# Patient Record
Sex: FEMALE | Race: WHITE | NOT HISPANIC OR LATINO
[De-identification: names, ages, dates, MRNs, and addresses within clinical notes are randomized per-mention and may not be internally consistent; named-entity substitution may affect disease eponyms.]

---

## 2017-11-21 ENCOUNTER — RESULT REVIEW (OUTPATIENT)
Age: 67
End: 2017-11-21

## 2018-01-30 ENCOUNTER — APPOINTMENT (OUTPATIENT)
Dept: INTERNAL MEDICINE | Facility: CLINIC | Age: 68
End: 2018-01-30

## 2018-04-17 ENCOUNTER — FORM ENCOUNTER (OUTPATIENT)
Age: 68
End: 2018-04-17

## 2018-04-17 ENCOUNTER — LABORATORY RESULT (OUTPATIENT)
Age: 68
End: 2018-04-17

## 2018-04-17 ENCOUNTER — NON-APPOINTMENT (OUTPATIENT)
Age: 68
End: 2018-04-17

## 2018-04-17 ENCOUNTER — APPOINTMENT (OUTPATIENT)
Dept: INTERNAL MEDICINE | Facility: CLINIC | Age: 68
End: 2018-04-17
Payer: COMMERCIAL

## 2018-04-17 ENCOUNTER — TRANSCRIPTION ENCOUNTER (OUTPATIENT)
Age: 68
End: 2018-04-17

## 2018-04-17 VITALS — DIASTOLIC BLOOD PRESSURE: 70 MMHG | SYSTOLIC BLOOD PRESSURE: 130 MMHG

## 2018-04-17 DIAGNOSIS — M54.32 SCIATICA, LEFT SIDE: ICD-10-CM

## 2018-04-17 DIAGNOSIS — Z81.8 FAMILY HISTORY OF OTHER MENTAL AND BEHAVIORAL DISORDERS: ICD-10-CM

## 2018-04-17 DIAGNOSIS — Z78.9 OTHER SPECIFIED HEALTH STATUS: ICD-10-CM

## 2018-04-17 LAB
BILIRUB UR QL STRIP: NORMAL
CLARITY UR: CLEAR
COLLECTION METHOD: NORMAL
GLUCOSE UR-MCNC: NORMAL
HCG UR QL: 0.2 EU/DL
HGB UR QL STRIP.AUTO: NORMAL
KETONES UR-MCNC: NORMAL
LEUKOCYTE ESTERASE UR QL STRIP: NORMAL
NITRITE UR QL STRIP: NORMAL
PH UR STRIP: 5
PROT UR STRIP-MCNC: NORMAL
SP GR UR STRIP: 1.01

## 2018-04-17 PROCEDURE — 99204 OFFICE O/P NEW MOD 45 MIN: CPT | Mod: 25

## 2018-04-17 PROCEDURE — 93000 ELECTROCARDIOGRAM COMPLETE: CPT

## 2018-04-17 PROCEDURE — 81003 URINALYSIS AUTO W/O SCOPE: CPT | Mod: QW

## 2018-04-17 PROCEDURE — 36415 COLL VENOUS BLD VENIPUNCTURE: CPT

## 2018-04-17 PROCEDURE — 90670 PCV13 VACCINE IM: CPT

## 2018-04-17 PROCEDURE — G0009: CPT

## 2018-04-18 ENCOUNTER — APPOINTMENT (OUTPATIENT)
Dept: INTERNAL MEDICINE | Facility: CLINIC | Age: 68
End: 2018-04-18
Payer: COMMERCIAL

## 2018-04-18 ENCOUNTER — OUTPATIENT (OUTPATIENT)
Dept: OUTPATIENT SERVICES | Facility: HOSPITAL | Age: 68
LOS: 1 days | End: 2018-04-18
Payer: COMMERCIAL

## 2018-04-18 ENCOUNTER — CLINICAL ADVICE (OUTPATIENT)
Age: 68
End: 2018-04-18

## 2018-04-18 ENCOUNTER — APPOINTMENT (OUTPATIENT)
Dept: CT IMAGING | Facility: CLINIC | Age: 68
End: 2018-04-18
Payer: COMMERCIAL

## 2018-04-18 ENCOUNTER — LABORATORY RESULT (OUTPATIENT)
Age: 68
End: 2018-04-18

## 2018-04-18 DIAGNOSIS — Z00.8 ENCOUNTER FOR OTHER GENERAL EXAMINATION: ICD-10-CM

## 2018-04-18 LAB
25(OH)D3 SERPL-MCNC: 23.1 NG/ML
ALBUMIN SERPL ELPH-MCNC: 4.3 G/DL
ALP BLD-CCNC: 49 U/L
ALT SERPL-CCNC: 16 U/L
ANION GAP SERPL CALC-SCNC: 11 MMOL/L
AST SERPL-CCNC: 19 U/L
BASOPHILS # BLD AUTO: 0.06 K/UL
BASOPHILS NFR BLD AUTO: 0.9 %
BILIRUB SERPL-MCNC: 0.4 MG/DL
BUN SERPL-MCNC: 13 MG/DL
CALCIUM SERPL-MCNC: 10 MG/DL
CHLORIDE SERPL-SCNC: 104 MMOL/L
CHOLEST SERPL-MCNC: 248 MG/DL
CHOLEST/HDLC SERPL: 4.5 RATIO
CO2 SERPL-SCNC: 26 MMOL/L
CREAT SERPL-MCNC: 0.89 MG/DL
EOSINOPHIL # BLD AUTO: 0.11 K/UL
EOSINOPHIL NFR BLD AUTO: 1.7 %
FERRITIN SERPL-MCNC: 71 NG/ML
GLUCOSE SERPL-MCNC: 90 MG/DL
HBA1C MFR BLD HPLC: 5.4 %
HCT VFR BLD CALC: 48.4 %
HDLC SERPL-MCNC: 55 MG/DL
HGB BLD-MCNC: 15.7 G/DL
IMM GRANULOCYTES NFR BLD AUTO: 0.2 %
LDLC SERPL CALC-MCNC: 154 MG/DL
LYMPHOCYTES # BLD AUTO: 1.62 K/UL
LYMPHOCYTES NFR BLD AUTO: 24.7 %
MAN DIFF?: NORMAL
MCHC RBC-ENTMCNC: 29.1 PG
MCHC RBC-ENTMCNC: 32.4 GM/DL
MCV RBC AUTO: 89.8 FL
MONOCYTES # BLD AUTO: 0.41 K/UL
MONOCYTES NFR BLD AUTO: 6.3 %
NEUTROPHILS # BLD AUTO: 4.35 K/UL
NEUTROPHILS NFR BLD AUTO: 66.2 %
PLATELET # BLD AUTO: 215 K/UL
POTASSIUM SERPL-SCNC: 5.5 MMOL/L
PROT SERPL-MCNC: 7.3 G/DL
RBC # BLD: 5.39 M/UL
RBC # FLD: 15.2 %
SAVE SPECIMEN: NORMAL
SODIUM SERPL-SCNC: 141 MMOL/L
T3RU NFR SERPL: 1.13 INDEX
T4 SERPL-MCNC: 6.7 UG/DL
TRIGL SERPL-MCNC: 197 MG/DL
TSH SERPL-ACNC: 2.82 UIU/ML
URATE SERPL-MCNC: 4.5 MG/DL
VIT B12 SERPL-MCNC: 461 PG/ML
WBC # FLD AUTO: 6.56 K/UL

## 2018-04-18 PROCEDURE — 74176 CT ABD & PELVIS W/O CONTRAST: CPT

## 2018-04-18 PROCEDURE — 36415 COLL VENOUS BLD VENIPUNCTURE: CPT

## 2018-04-18 PROCEDURE — 74176 CT ABD & PELVIS W/O CONTRAST: CPT | Mod: 26

## 2018-04-19 LAB
POTASSIUM SERPL-SCNC: 4.4 MMOL/L
POTASSIUM SERPL-SCNC: 4.5 MMOL/L

## 2018-04-29 ENCOUNTER — FORM ENCOUNTER (OUTPATIENT)
Age: 68
End: 2018-04-29

## 2018-04-30 ENCOUNTER — APPOINTMENT (OUTPATIENT)
Dept: MRI IMAGING | Facility: CLINIC | Age: 68
End: 2018-04-30

## 2018-04-30 ENCOUNTER — APPOINTMENT (OUTPATIENT)
Dept: ULTRASOUND IMAGING | Facility: CLINIC | Age: 68
End: 2018-04-30
Payer: COMMERCIAL

## 2018-04-30 ENCOUNTER — OUTPATIENT (OUTPATIENT)
Dept: OUTPATIENT SERVICES | Facility: HOSPITAL | Age: 68
LOS: 1 days | End: 2018-04-30
Payer: COMMERCIAL

## 2018-04-30 DIAGNOSIS — R16.0 HEPATOMEGALY, NOT ELSEWHERE CLASSIFIED: ICD-10-CM

## 2018-04-30 PROCEDURE — 72148 MRI LUMBAR SPINE W/O DYE: CPT | Mod: 26

## 2018-04-30 PROCEDURE — 93975 VASCULAR STUDY: CPT | Mod: 26

## 2018-04-30 PROCEDURE — 72148 MRI LUMBAR SPINE W/O DYE: CPT

## 2018-04-30 PROCEDURE — 93975 VASCULAR STUDY: CPT

## 2018-05-15 ENCOUNTER — APPOINTMENT (OUTPATIENT)
Dept: CARDIOLOGY | Facility: CLINIC | Age: 68
End: 2018-05-15
Payer: COMMERCIAL

## 2018-05-15 VITALS
BODY MASS INDEX: 29.37 KG/M2 | TEMPERATURE: 98 F | HEIGHT: 64 IN | OXYGEN SATURATION: 97 % | SYSTOLIC BLOOD PRESSURE: 131 MMHG | DIASTOLIC BLOOD PRESSURE: 77 MMHG | HEART RATE: 78 BPM | WEIGHT: 172 LBS

## 2018-05-15 PROCEDURE — 99244 OFF/OP CNSLTJ NEW/EST MOD 40: CPT

## 2019-04-12 ENCOUNTER — LABORATORY RESULT (OUTPATIENT)
Age: 69
End: 2019-04-12

## 2019-04-12 ENCOUNTER — TRANSCRIPTION ENCOUNTER (OUTPATIENT)
Age: 69
End: 2019-04-12

## 2019-04-12 ENCOUNTER — APPOINTMENT (OUTPATIENT)
Dept: INTERNAL MEDICINE | Facility: CLINIC | Age: 69
End: 2019-04-12
Payer: COMMERCIAL

## 2019-04-12 VITALS
TEMPERATURE: 98.3 F | WEIGHT: 171 LBS | HEART RATE: 77 BPM | BODY MASS INDEX: 29.92 KG/M2 | SYSTOLIC BLOOD PRESSURE: 134 MMHG | DIASTOLIC BLOOD PRESSURE: 83 MMHG | HEIGHT: 63.5 IN

## 2019-04-12 PROCEDURE — 99213 OFFICE O/P EST LOW 20 MIN: CPT

## 2019-04-12 NOTE — ASSESSMENT
[FreeTextEntry1] : As per Dr Montelongo, start Amlodipine 2.5mg \par keep hands warm\par Vascular \par Labs\par \par Possible Raynauds\par \par \par FU with ENT, nasal spray

## 2019-04-12 NOTE — PHYSICAL EXAM
[No Acute Distress] : no acute distress [Normal Outer Ear/Nose] : the outer ears and nose were normal in appearance [Well Nourished] : well nourished [Well Developed] : well developed [No Respiratory Distress] : no respiratory distress  [Normal Oropharynx] : the oropharynx was normal [Clear to Auscultation] : lungs were clear to auscultation bilaterally [No Accessory Muscle Use] : no accessory muscle use [Normal S1, S2] : normal S1 and S2 [Normal Rate] : normal rate  [Regular Rhythm] : with a regular rhythm [Normal Affect] : the affect was normal [de-identified] : cloudy krish white ear drums [Normal Insight/Judgement] : insight and judgment were intact

## 2019-04-12 NOTE — HISTORY OF PRESENT ILLNESS
[FreeTextEntry8] : 68 year old female here today with c/o 10 days of blueish finger. \par It sometimes turns white. \par It is numb. \par She states its better in the morning when her hands are covered overnight. \par She states it feels cold. \par \par Her ear also feels clogged. She states she usually has issues with her ears.

## 2019-04-17 LAB
ALBUMIN SERPL ELPH-MCNC: 4.5 G/DL
ALP BLD-CCNC: 49 U/L
ALT SERPL-CCNC: 17 U/L
ANA PAT FLD IF-IMP: ABNORMAL
ANA PATTERN: ABNORMAL
ANA SER IF-ACNC: ABNORMAL
ANA TITER: ABNORMAL
ANION GAP SERPL CALC-SCNC: 10 MMOL/L
AST SERPL-CCNC: 21 U/L
BASOPHILS # BLD AUTO: 0.06 K/UL
BASOPHILS NFR BLD AUTO: 0.9 %
BILIRUB SERPL-MCNC: 0.2 MG/DL
BUN SERPL-MCNC: 15 MG/DL
CALCIUM SERPL-MCNC: 10 MG/DL
CHLORIDE SERPL-SCNC: 103 MMOL/L
CHOLEST SERPL-MCNC: 224 MG/DL
CHOLEST/HDLC SERPL: 3.4 RATIO
CO2 SERPL-SCNC: 27 MMOL/L
CREAT SERPL-MCNC: 0.8 MG/DL
EOSINOPHIL # BLD AUTO: 0.1 K/UL
EOSINOPHIL NFR BLD AUTO: 1.4 %
ERYTHROCYTE [SEDIMENTATION RATE] IN BLOOD BY WESTERGREN METHOD: 5 MM/HR
GLUCOSE SERPL-MCNC: 85 MG/DL
HCT VFR BLD CALC: 50.6 %
HDLC SERPL-MCNC: 66 MG/DL
HGB BLD-MCNC: 15.8 G/DL
IMM GRANULOCYTES NFR BLD AUTO: 0.4 %
LDLC SERPL CALC-MCNC: 135 MG/DL
LYMPHOCYTES # BLD AUTO: 1.97 K/UL
LYMPHOCYTES NFR BLD AUTO: 28.6 %
MAN DIFF?: NORMAL
MCHC RBC-ENTMCNC: 29.7 PG
MCHC RBC-ENTMCNC: 31.2 GM/DL
MCV RBC AUTO: 95.1 FL
MONOCYTES # BLD AUTO: 0.51 K/UL
MONOCYTES NFR BLD AUTO: 7.4 %
NEUTROPHILS # BLD AUTO: 4.23 K/UL
NEUTROPHILS NFR BLD AUTO: 61.3 %
PLATELET # BLD AUTO: 236 K/UL
POTASSIUM SERPL-SCNC: 5.2 MMOL/L
PROT SERPL-MCNC: 7.4 G/DL
RBC # BLD: 5.32 M/UL
RBC # FLD: 14.6 %
SAVE SPECIMEN: NORMAL
SODIUM SERPL-SCNC: 140 MMOL/L
TRIGL SERPL-MCNC: 113 MG/DL
WBC # FLD AUTO: 6.9 K/UL

## 2019-04-30 ENCOUNTER — RESULT REVIEW (OUTPATIENT)
Age: 69
End: 2019-04-30

## 2019-05-06 ENCOUNTER — APPOINTMENT (OUTPATIENT)
Dept: OTOLARYNGOLOGY | Facility: CLINIC | Age: 69
End: 2019-05-06
Payer: COMMERCIAL

## 2019-05-06 VITALS
DIASTOLIC BLOOD PRESSURE: 77 MMHG | HEART RATE: 87 BPM | SYSTOLIC BLOOD PRESSURE: 123 MMHG | HEIGHT: 64 IN | WEIGHT: 170 LBS | BODY MASS INDEX: 29.02 KG/M2

## 2019-05-06 PROCEDURE — 99204 OFFICE O/P NEW MOD 45 MIN: CPT | Mod: 25

## 2019-05-06 PROCEDURE — 92557 COMPREHENSIVE HEARING TEST: CPT

## 2019-05-06 PROCEDURE — 92567 TYMPANOMETRY: CPT

## 2019-05-06 NOTE — PHYSICAL EXAM
[Midline] : trachea located in midline position [Normal] : no rashes [de-identified] : b/l thin TM and retracted, clear fluid in Left [] : septum deviated to the left

## 2019-05-06 NOTE — REASON FOR VISIT
[Initial Evaluation] : an initial evaluation for [Hearing Loss] : hearing loss [FreeTextEntry2] : L sided hearing loss

## 2019-05-06 NOTE — HISTORY OF PRESENT ILLNESS
[M & T] : myringotomy tube [Hearing Loss] : hearing loss [Recurrent Otitis Media] : recurrent otitis media [Otitis Media with Effusion] : otitis media with effusion [Eustachian Tube Dysfunction] : eustachian tube dysfunction [Nasal Congestion] : nasal congestion [Ear Fullness] : ear fullness [None] : No risk factors have been identified. [de-identified] : 67 y/o female w/ hx of recurrent ear infections, SNHL loss,  BMT 2014 and L TM perforation (1974) who came in complaining of hearing loss in her L ear that started 2 months ago. She states prior to losing her hearing she had a cold and traveled to [place]. She used pseuophed and steroids but it did not help her hearing. Her L ear feels clogged today. She was seen by Dr. Temple in he past who did her tubes. She has mild nasal congestion today. She has no other modifying factors. Pt has no ear pain, ear drainage, tinnitus, vertigo, nasal congestion, nasal discharge, epistaxis, sinus infections, facial pain, facial pressure, throat pain, dysphagia or fevers\par  [Tinnitus] : no tinnitus [Anxiety] : no anxiety [Dizziness] : no dizziness [Headache] : no headache [Otalgia] : no otalgia [Otorrhea] : no otorrhea [Vertigo] : no vertigo [Congenital Ear Malformation] : no congenital ear malformation [Meniere Disease] : no Meniere disease [Otosclerosis] : no otosclerosis [Perilymphatic Fistula] : no perilymphatic fistula [Hypertension] : no hypertension [Loud Noise Exposure] : no history of loud noise exposure [Smoking] : no smoking [Early Onset Hearing Loss] : no early onset hearing loss [Stroke] : no stroke [Facial Pain] : no facial pain [Facial Pressure] : no facial pressure [Clear Rhinorrhea] : no clear rhinorrhea [Purulent Rhinorrhea] : no purulent rhinorrhea [Postnasal Drainage] : no postnasal drainage [Ear Pressure] : no ear pressure [Ear Pain] : no ear pain [Diplopia] : no diplopia [Environmental Allergies] : no environmental allergies [Allergic Rhinitis] : no allergic rhinitis [Adenoidectomy] : no adenoidectomy [Environmental Allergens] : no environmental allergens [Seasonal Allergies] : no seasonal allergies [Allergies] : no allergies [Asthma] : no asthma [Chills] : no chills [Neck Mass] : no neck mass [Neck Pain] : no neck pain [Cold Intolerance] : no cold intolerance [Cough] : no cough [Heat Intolerance] : no heat intolerance [Fatigue] : no fatigue [Hyperthyroidism] : no hyperthyroidism [Sialadenitis] : no sialadenitis [Non-Hodgkin Lymphoma] : no non-hodgkin lymphoma [Hodgkin Disease] : no hodgkin disease [Alcohol Use] : no alcohol use [Tobacco Use] : no tobacco use [Graves Disease] : no graves disease [Thyroid Cancer] : no thyroid cancer

## 2019-05-06 NOTE — ASSESSMENT
[FreeTextEntry1] : 67 y/o female w/ hx of recurrent ear infections, SNHL, and BMT 20174 who is having L sided hearing loss and fluid in the L ear \par \par Dx ETD and hearing loss\par Rx Flonase and pop ears\par poss M&T\par \par f/u 7-10 days\par \par \par

## 2019-05-13 ENCOUNTER — NON-APPOINTMENT (OUTPATIENT)
Age: 69
End: 2019-05-13

## 2019-05-13 ENCOUNTER — APPOINTMENT (OUTPATIENT)
Dept: INTERNAL MEDICINE | Facility: CLINIC | Age: 69
End: 2019-05-13
Payer: COMMERCIAL

## 2019-05-13 VITALS — DIASTOLIC BLOOD PRESSURE: 70 MMHG | SYSTOLIC BLOOD PRESSURE: 120 MMHG

## 2019-05-13 VITALS — BODY MASS INDEX: 29.77 KG/M2 | HEIGHT: 63 IN | WEIGHT: 168 LBS

## 2019-05-13 DIAGNOSIS — R00.0 TACHYCARDIA, UNSPECIFIED: ICD-10-CM

## 2019-05-13 PROCEDURE — 36415 COLL VENOUS BLD VENIPUNCTURE: CPT

## 2019-05-13 PROCEDURE — 93000 ELECTROCARDIOGRAM COMPLETE: CPT

## 2019-05-13 PROCEDURE — 99397 PER PM REEVAL EST PAT 65+ YR: CPT | Mod: 25

## 2019-05-13 NOTE — HEALTH RISK ASSESSMENT
[No falls in past year] : Patient reported no falls in the past year [0] : 2) Feeling down, depressed, or hopeless: Not at all (0) [Fully functional (bathing, dressing, toileting, transferring, walking, feeding)] : Fully functional (bathing, dressing, toileting, transferring, walking, feeding) [Fully functional (using the telephone, shopping, preparing meals, housekeeping, doing laundry, using] : Fully functional and needs no help or supervision to perform IADLs (using the telephone, shopping, preparing meals, housekeeping, doing laundry, using transportation, managing medications and managing finances) [With Patient/Caregiver] : With Patient/Caregiver [I will adhere to the patient's wishes as expressed in the advance directive except as noted below.] : I will adhere to the patient's wishes as expressed in the advance directive except as noted below [] : No [[id number]] : 0 [AdvancecareDate] : 5/13/19

## 2019-05-13 NOTE — REVIEW OF SYSTEMS
[Back Pain] : back pain [Negative] : Heme/Lymph [FreeTextEntry5] : Raynaud's syndrome of both hands [Palpitations] : no palpitations

## 2019-05-13 NOTE — HISTORY OF PRESENT ILLNESS
[de-identified] : This is a 68-year-old woman here for her annual examination. Her main concern is severe Raynaud's syndrome. She went to see a rheumatologist who did extensive immune evaluation this and the only positive result for mildly elevated Sjogren syndrome titers. He treated her with Norvasc 5 mg daily and will switch from Trental  400 mg t.i.d. and also started her on Lipitor 10 mg daily because of elevated cholesterol.

## 2019-05-13 NOTE — ASSESSMENT
[FreeTextEntry1] : Problems\par Raynaud's syndrome- this is presently being treated with Norvasc 5 mg daily, Lipitor 10 mg daily, Trental 400 mg t.i.d.\par Hypercholesterolemia- she was recently started on Lipitor 10 mg daily\par I also referred the patient for arterial Dopplers of the upper sternum and is

## 2019-05-13 NOTE — PHYSICAL EXAM
[Well Nourished] : well nourished [Well Developed] : well developed [Well-Appearing] : well-appearing [EOMI] : extraocular movements intact [PERRL] : pupils equal round and reactive to light [Normal Oropharynx] : the oropharynx was normal [No Lymphadenopathy] : no lymphadenopathy [Supple] : supple [Clear to Auscultation] : lungs were clear to auscultation bilaterally [Thyroid Normal, No Nodules] : the thyroid was normal and there were no nodules present [No Accessory Muscle Use] : no accessory muscle use [No Murmur] : no murmur heard [Regular Rhythm] : with a regular rhythm [Normal S1, S2] : normal S1 and S2 [No Carotid Bruits] : no carotid bruits [No Varicosities] : no varicosities [Pedal Pulses Present] : the pedal pulses are present [Declined Rectal Exam] : declined rectal exam [Normal Posterior Cervical Nodes] : no posterior cervical lymphadenopathy [Declined Breast Exam] : declined breast exam  [Normal Anterior Cervical Nodes] : no anterior cervical lymphadenopathy [No CVA Tenderness] : no CVA  tenderness [No Joint Swelling] : no joint swelling [No Spinal Tenderness] : no spinal tenderness [Grossly Normal Strength/Tone] : grossly normal strength/tone [No Rash] : no rash [Normal Gait] : normal gait [Coordination Grossly Intact] : coordination grossly intact [Deep Tendon Reflexes (DTR)] : deep tendon reflexes were 2+ and symmetric [No Focal Deficits] : no focal deficits [Normal Insight/Judgement] : insight and judgment were intact [Normal Affect] : the affect was normal [de-identified] : Hepatomegaly

## 2019-05-14 ENCOUNTER — FORM ENCOUNTER (OUTPATIENT)
Age: 69
End: 2019-05-14

## 2019-05-15 ENCOUNTER — APPOINTMENT (OUTPATIENT)
Dept: ULTRASOUND IMAGING | Facility: CLINIC | Age: 69
End: 2019-05-15
Payer: COMMERCIAL

## 2019-05-15 ENCOUNTER — OUTPATIENT (OUTPATIENT)
Dept: OUTPATIENT SERVICES | Facility: HOSPITAL | Age: 69
LOS: 1 days | End: 2019-05-15

## 2019-05-15 DIAGNOSIS — I73.00 RAYNAUD'S SYNDROME WITHOUT GANGRENE: ICD-10-CM

## 2019-05-15 PROCEDURE — 93930 UPPER EXTREMITY STUDY: CPT | Mod: 26

## 2019-09-18 ENCOUNTER — APPOINTMENT (OUTPATIENT)
Dept: INTERNAL MEDICINE | Facility: CLINIC | Age: 69
End: 2019-09-18
Payer: COMMERCIAL

## 2019-09-18 PROCEDURE — G0009: CPT

## 2019-09-18 PROCEDURE — 90732 PPSV23 VACC 2 YRS+ SUBQ/IM: CPT

## 2019-10-23 ENCOUNTER — APPOINTMENT (OUTPATIENT)
Dept: OTOLARYNGOLOGY | Facility: CLINIC | Age: 69
End: 2019-10-23
Payer: COMMERCIAL

## 2019-10-23 VITALS
HEART RATE: 77 BPM | SYSTOLIC BLOOD PRESSURE: 119 MMHG | BODY MASS INDEX: 30.12 KG/M2 | WEIGHT: 170 LBS | DIASTOLIC BLOOD PRESSURE: 72 MMHG | HEIGHT: 63 IN

## 2019-10-23 PROCEDURE — 69433 CREATE EARDRUM OPENING: CPT | Mod: LT

## 2019-10-23 PROCEDURE — 99214 OFFICE O/P EST MOD 30 MIN: CPT | Mod: 25

## 2019-10-23 PROCEDURE — 92504 EAR MICROSCOPY EXAMINATION: CPT | Mod: 59

## 2019-10-23 NOTE — PROCEDURE
[Risk and Benefits Discussed] : The purpose, risks, discomforts, benefits and alternatives of the procedure have been explained to the patient including no treatment. [Cerumen Impaction] : Cerumen Impaction [Same] : same as the Pre Op Dx. [] : M & T [FreeTextEntry1] : left OME [FreeTextEntry6] : wax removed \par the microscope was necessary for illumination and magnification\par M&T performed and pt reported improved hearing

## 2019-10-23 NOTE — REASON FOR VISIT
[Subsequent Evaluation] : a subsequent evaluation for [Hearing Loss] : hearing loss [FreeTextEntry2] : L sided hearing loss

## 2019-10-23 NOTE — PHYSICAL EXAM
[de-identified] : b/l thin TM and retracted, clear fluid in Left [] : septum deviated to the left [Midline] : trachea located in midline position [Normal] : no rashes

## 2019-10-23 NOTE — ASSESSMENT
[FreeTextEntry1] : 67 y/o female w/ hx of recurrent ear infections, SNHL, and BMT 2014 who is having L sided hearing loss and fluid in the L ear \par \par Dx ETD and hearing loss\par tx-M&T\par \par f/u 2 weeks\par \par \par

## 2019-10-23 NOTE — HISTORY OF PRESENT ILLNESS
[M & T] : myringotomy tube [de-identified] : 67 y/o female w/ hx of recurrent ear infections, SNHL loss,  BMT 2014 and L TM perforation (1974) who came in complaining of hearing loss in her L ear that started 2 months ago. She states prior to losing her hearing she had a cold and traveled to [place]. She used pseuophed and steroids but it did not help her hearing. Her L ear feels clogged today. She was seen by Dr. Temple in he past who did her tubes. She has mild nasal congestion today. She has no other modifying factors. Pt has no ear pain, ear drainage, tinnitus, vertigo, nasal congestion, nasal discharge, epistaxis, sinus infections, facial pain, facial pressure, throat pain, dysphagia or fevers\par  [Tinnitus] : no tinnitus [Anxiety] : no anxiety [Dizziness] : no dizziness [Headache] : no headache [Hearing Loss] : hearing loss [Otalgia] : no otalgia [Otorrhea] : no otorrhea [Vertigo] : no vertigo [Recurrent Otitis Media] : recurrent otitis media [Otitis Media with Effusion] : otitis media with effusion [Congenital Ear Malformation] : no congenital ear malformation [Meniere Disease] : no Meniere disease [Eustachian Tube Dysfunction] : eustachian tube dysfunction [Otosclerosis] : no otosclerosis [Perilymphatic Fistula] : no perilymphatic fistula [Hypertension] : no hypertension [Loud Noise Exposure] : no history of loud noise exposure [Smoking] : no smoking [Early Onset Hearing Loss] : no early onset hearing loss [Stroke] : no stroke [Facial Pain] : no facial pain [Clear Rhinorrhea] : no clear rhinorrhea [Facial Pressure] : no facial pressure [Purulent Rhinorrhea] : no purulent rhinorrhea [Nasal Congestion] : nasal congestion [Postnasal Drainage] : no postnasal drainage [Ear Pain] : no ear pain [Ear Pressure] : no ear pressure [Diplopia] : no diplopia [Ear Fullness] : ear fullness [Allergic Rhinitis] : no allergic rhinitis [Environmental Allergies] : no environmental allergies [Seasonal Allergies] : no seasonal allergies [Environmental Allergens] : no environmental allergens [Adenoidectomy] : no adenoidectomy [Allergies] : no allergies [Asthma] : no asthma [Neck Mass] : no neck mass [Neck Pain] : no neck pain [Chills] : no chills [Cold Intolerance] : no cold intolerance [Cough] : no cough [Fatigue] : no fatigue [Heat Intolerance] : no heat intolerance [Hyperthyroidism] : no hyperthyroidism [Sialadenitis] : no sialadenitis [Hodgkin Disease] : no hodgkin disease [Non-Hodgkin Lymphoma] : no non-hodgkin lymphoma [None] : No risk factors have been identified. [Tobacco Use] : no tobacco use [Alcohol Use] : no alcohol use [Graves Disease] : no graves disease [Thyroid Cancer] : no thyroid cancer

## 2019-11-07 ENCOUNTER — APPOINTMENT (OUTPATIENT)
Dept: OTOLARYNGOLOGY | Facility: CLINIC | Age: 69
End: 2019-11-07
Payer: COMMERCIAL

## 2019-11-07 VITALS
DIASTOLIC BLOOD PRESSURE: 77 MMHG | BODY MASS INDEX: 30.12 KG/M2 | HEIGHT: 63 IN | WEIGHT: 170 LBS | SYSTOLIC BLOOD PRESSURE: 120 MMHG | HEART RATE: 72 BPM

## 2019-11-07 PROCEDURE — 92557 COMPREHENSIVE HEARING TEST: CPT

## 2019-11-07 PROCEDURE — 69433 CREATE EARDRUM OPENING: CPT | Mod: RT

## 2019-11-07 PROCEDURE — 99214 OFFICE O/P EST MOD 30 MIN: CPT | Mod: 25

## 2019-11-07 PROCEDURE — 92567 TYMPANOMETRY: CPT

## 2019-11-07 PROCEDURE — 92504 EAR MICROSCOPY EXAMINATION: CPT | Mod: 59

## 2019-11-08 NOTE — HISTORY OF PRESENT ILLNESS
[M & T] : myringotomy tube [de-identified] : hearing better in left ear s/p M&T now wants Right M&T\par 67 y/o female w/ hx of recurrent ear infections, SNHL loss,  BMT 2014 and L TM perforation (1974) who came in complaining of hearing loss in her L ear that started 2 months ago. She states prior to losing her hearing she had a cold and traveled to [place]. She used pseuophed and steroids but it did not help her hearing. Her L ear feels clogged today. She was seen by Dr. Temple in he past who did her tubes. She has mild nasal congestion today. She has no other modifying factors. Pt has no ear pain, ear drainage, tinnitus, vertigo, nasal congestion, nasal discharge, epistaxis, sinus infections, facial pain, facial pressure, throat pain, dysphagia or fevers\par  [Tinnitus] : no tinnitus [Anxiety] : no anxiety [Dizziness] : no dizziness [Headache] : no headache [Hearing Loss] : hearing loss [Otalgia] : no otalgia [Otorrhea] : no otorrhea [Vertigo] : no vertigo [Recurrent Otitis Media] : recurrent otitis media [Otitis Media with Effusion] : otitis media with effusion [Congenital Ear Malformation] : no congenital ear malformation [Meniere Disease] : no Meniere disease [Eustachian Tube Dysfunction] : eustachian tube dysfunction [Otosclerosis] : no otosclerosis [Perilymphatic Fistula] : no perilymphatic fistula [Hypertension] : no hypertension [Loud Noise Exposure] : no history of loud noise exposure [Smoking] : no smoking [Early Onset Hearing Loss] : no early onset hearing loss [Stroke] : no stroke [Facial Pain] : no facial pain [Clear Rhinorrhea] : no clear rhinorrhea [Purulent Rhinorrhea] : no purulent rhinorrhea [Facial Pressure] : no facial pressure [Nasal Congestion] : nasal congestion [Postnasal Drainage] : no postnasal drainage [Ear Pain] : no ear pain [Ear Pressure] : no ear pressure [Diplopia] : no diplopia [Ear Fullness] : ear fullness [Allergic Rhinitis] : no allergic rhinitis [Environmental Allergies] : no environmental allergies [Seasonal Allergies] : no seasonal allergies [Adenoidectomy] : no adenoidectomy [Environmental Allergens] : no environmental allergens [Allergies] : no allergies [Asthma] : no asthma [Neck Mass] : no neck mass [Neck Pain] : no neck pain [Chills] : no chills [Cold Intolerance] : no cold intolerance [Cough] : no cough [Fatigue] : no fatigue [Heat Intolerance] : no heat intolerance [Hyperthyroidism] : no hyperthyroidism [Sialadenitis] : no sialadenitis [None] : No risk factors have been identified. [Hodgkin Disease] : no hodgkin disease [Non-Hodgkin Lymphoma] : no non-hodgkin lymphoma [Tobacco Use] : no tobacco use [Alcohol Use] : no alcohol use [Thyroid Cancer] : no thyroid cancer [Graves Disease] : no graves disease

## 2019-11-08 NOTE — DATA REVIEWED
[de-identified] : left ear improved\par right mild chl\par Hearing Test performed to evaluate the extent of hearing loss and assymetry to explain pt's symptoms\par \par

## 2019-11-08 NOTE — PHYSICAL EXAM
[de-identified] : left tube ok  right ETD [] : septum deviated to the left [Midline] : trachea located in midline position [Normal] : no rashes

## 2019-11-08 NOTE — ASSESSMENT
[FreeTextEntry1] : 69 y/o female w/ hx of recurrent ear infections, SNHL, and BMT 2014 who is having R sided hearing loss and fluid in the R ear \par \par now s/p BMT\par f/u 2 weelks\par \par \par \par

## 2019-11-21 ENCOUNTER — APPOINTMENT (OUTPATIENT)
Dept: OTOLARYNGOLOGY | Facility: CLINIC | Age: 69
End: 2019-11-21
Payer: COMMERCIAL

## 2019-11-21 VITALS
HEIGHT: 63 IN | DIASTOLIC BLOOD PRESSURE: 66 MMHG | BODY MASS INDEX: 30.12 KG/M2 | WEIGHT: 170 LBS | HEART RATE: 86 BPM | SYSTOLIC BLOOD PRESSURE: 109 MMHG

## 2019-11-21 PROCEDURE — 99213 OFFICE O/P EST LOW 20 MIN: CPT

## 2019-11-21 NOTE — HISTORY OF PRESENT ILLNESS
[M & T] : myringotomy tube [de-identified] : s/p BMT and is hearing bteer\par occ ear crackling\par hx-67 y/o female w/ hx of recurrent ear infections, SNHL loss,  BMT 2014 and L TM perforation (1974) who came in complaining of hearing loss in her L ear that started 2 months ago. She states prior to losing her hearing she had a cold and traveled to [place]. She used pseuophed and steroids but it did not help her hearing. Her L ear feels clogged today. She was seen by Dr. Temple in he past who did her tubes. She has mild nasal congestion today. She has no other modifying factors. Pt has no ear pain, ear drainage, tinnitus, vertigo, nasal congestion, nasal discharge, epistaxis, sinus infections, facial pain, facial pressure, throat pain, dysphagia or fevers\par  [Tinnitus] : no tinnitus [Anxiety] : no anxiety [Dizziness] : no dizziness [Headache] : no headache [Hearing Loss] : hearing loss [Otalgia] : no otalgia [Otorrhea] : no otorrhea [Vertigo] : no vertigo [Recurrent Otitis Media] : recurrent otitis media [Otitis Media with Effusion] : otitis media with effusion [Congenital Ear Malformation] : no congenital ear malformation [Meniere Disease] : no Meniere disease [Eustachian Tube Dysfunction] : eustachian tube dysfunction [Otosclerosis] : no otosclerosis [Perilymphatic Fistula] : no perilymphatic fistula [Hypertension] : no hypertension [Loud Noise Exposure] : no history of loud noise exposure [Smoking] : no smoking [Early Onset Hearing Loss] : no early onset hearing loss [Stroke] : no stroke [Facial Pain] : no facial pain [Clear Rhinorrhea] : no clear rhinorrhea [Facial Pressure] : no facial pressure [Purulent Rhinorrhea] : no purulent rhinorrhea [Nasal Congestion] : nasal congestion [Postnasal Drainage] : no postnasal drainage [Ear Pain] : no ear pain [Ear Pressure] : no ear pressure [Diplopia] : no diplopia [Ear Fullness] : ear fullness [Allergic Rhinitis] : no allergic rhinitis [Environmental Allergies] : no environmental allergies [Seasonal Allergies] : no seasonal allergies [Environmental Allergens] : no environmental allergens [Adenoidectomy] : no adenoidectomy [Allergies] : no allergies [Asthma] : no asthma [Neck Mass] : no neck mass [Neck Pain] : no neck pain [Chills] : no chills [Cold Intolerance] : no cold intolerance [Cough] : no cough [Fatigue] : no fatigue [Heat Intolerance] : no heat intolerance [Hyperthyroidism] : no hyperthyroidism [Sialadenitis] : no sialadenitis [Hodgkin Disease] : no hodgkin disease [Non-Hodgkin Lymphoma] : no non-hodgkin lymphoma [None] : No risk factors have been identified. [Tobacco Use] : no tobacco use [Alcohol Use] : no alcohol use [Graves Disease] : no graves disease [Thyroid Cancer] : no thyroid cancer

## 2019-11-21 NOTE — PHYSICAL EXAM
[de-identified] : tubes ok bilat [] : septum deviated to the left [Midline] : trachea located in midline position [Normal] : salivary glands are normal

## 2019-11-21 NOTE — ASSESSMENT
[FreeTextEntry1] : This patient has a history of Eustachian tube dysfunction and conductive hearing loss and is seen following ear tube placement. I recommend expectant management and follow up in 3-4 months\par \par

## 2020-02-03 ENCOUNTER — APPOINTMENT (OUTPATIENT)
Dept: INTERNAL MEDICINE | Facility: CLINIC | Age: 70
End: 2020-02-03
Payer: COMMERCIAL

## 2020-02-03 VITALS — BODY MASS INDEX: 28.68 KG/M2 | OXYGEN SATURATION: 98 % | WEIGHT: 168 LBS | TEMPERATURE: 97.7 F | HEIGHT: 64 IN

## 2020-02-03 VITALS — DIASTOLIC BLOOD PRESSURE: 60 MMHG | SYSTOLIC BLOOD PRESSURE: 110 MMHG

## 2020-02-03 PROCEDURE — 99214 OFFICE O/P EST MOD 30 MIN: CPT

## 2020-02-03 RX ORDER — OFLOXACIN OTIC 3 MG/ML
0.3 SOLUTION AURICULAR (OTIC)
Qty: 1 | Refills: 3 | Status: DISCONTINUED | COMMUNITY
Start: 2019-10-23 | End: 2020-02-03

## 2020-02-07 RX ORDER — METHYLPREDNISOLONE 4 MG/1
4 TABLET ORAL
Qty: 1 | Refills: 1 | Status: DISCONTINUED | COMMUNITY
Start: 2020-02-03 | End: 2020-02-07

## 2020-02-07 NOTE — PHYSICAL EXAM
[Normal] : soft, non-tender, non-distended, no masses palpated, no HSM and normal bowel sounds [de-identified] : Bilateral rhonchi

## 2020-02-07 NOTE — HISTORY OF PRESENT ILLNESS
[de-identified] : This is a 69-year-old patient with a history of an autoimmune disorder characterized by an NATIVIDAD of 2500. In addition she has chronic ear infections with sensorineural hearing loss and presently has bilateral tubes in both ears. She's presently complaining of nasal discharge productive cough and multi-decrease in hearing. She denies any fever or chills

## 2020-02-07 NOTE — ASSESSMENT
[FreeTextEntry1] : Problems\par Immune disorder- the patient has a positive NATIVIDAD of 2500. Her further serological tests are essentially normal she is being treated with a calcium channel blocker for her Raynaud's disease\par Hearing loss- this is being followed by her ENT physician\par Chronic cough\par Nasal congestion\par Pharyngitis\par Assessment\par This is a patient with a normal disorder who clearly has an upper and lower respiratory tract infection characterized by probably a sinusitis with some ear infections and distally of bronchitis. I treated her with Augmentin 875 mg b.i.d. for a week and I started her on his Medrol pack. In addition I started her on Tessalon Perles 100 mg t.i.d. and referred her to our ear nose and throat doctor for further followup

## 2020-02-07 NOTE — REVIEW OF SYSTEMS
[Hearing Loss] : hearing loss [Nasal Discharge] : nasal discharge [Sore Throat] : sore throat [Postnasal Drip] : postnasal drip [Cough] : cough [Joint Pain] : joint pain [Muscle Pain] : muscle pain [Negative] : Gastrointestinal [Wheezing] : no wheezing

## 2020-02-18 ENCOUNTER — APPOINTMENT (OUTPATIENT)
Dept: INTERNAL MEDICINE | Facility: CLINIC | Age: 70
End: 2020-02-18
Payer: COMMERCIAL

## 2020-02-18 VITALS — TEMPERATURE: 98.2 F | WEIGHT: 168 LBS | BODY MASS INDEX: 28.68 KG/M2 | HEIGHT: 64 IN

## 2020-02-18 DIAGNOSIS — Z87.09 PERSONAL HISTORY OF OTHER DISEASES OF THE RESPIRATORY SYSTEM: ICD-10-CM

## 2020-02-18 LAB — S PYO AG SPEC QL IA: NEGATIVE

## 2020-02-18 PROCEDURE — 87880 STREP A ASSAY W/OPTIC: CPT | Mod: QW

## 2020-02-18 PROCEDURE — 99213 OFFICE O/P EST LOW 20 MIN: CPT | Mod: 25

## 2020-02-18 RX ORDER — AMOXICILLIN AND CLAVULANATE POTASSIUM 875; 125 MG/1; MG/1
875-125 TABLET, COATED ORAL
Qty: 14 | Refills: 1 | Status: DISCONTINUED | COMMUNITY
Start: 2020-02-03 | End: 2020-02-18

## 2020-02-18 NOTE — ASSESSMENT
[FreeTextEntry1] : Vital signs are stable her physical examination was normal except for a mild pharyngitis. She was treated with a Z-Owen

## 2020-02-18 NOTE — HISTORY OF PRESENT ILLNESS
[de-identified] : This is a patient who recently was treated for an asthmatic bronchitis who is back because of a recurrent pharyngitis and occasional cough. She denies any sputum or any fever

## 2020-11-17 ENCOUNTER — RESULT REVIEW (OUTPATIENT)
Age: 70
End: 2020-11-17

## 2020-12-23 PROBLEM — Z87.09 HISTORY OF ACUTE BRONCHITIS: Status: RESOLVED | Noted: 2020-02-03 | Resolved: 2020-12-23

## 2021-01-28 ENCOUNTER — NON-APPOINTMENT (OUTPATIENT)
Age: 71
End: 2021-01-28

## 2021-03-02 ENCOUNTER — LABORATORY RESULT (OUTPATIENT)
Age: 71
End: 2021-03-02

## 2021-03-02 ENCOUNTER — NON-APPOINTMENT (OUTPATIENT)
Age: 71
End: 2021-03-02

## 2021-03-02 ENCOUNTER — APPOINTMENT (OUTPATIENT)
Dept: INTERNAL MEDICINE | Facility: CLINIC | Age: 71
End: 2021-03-02
Payer: MEDICARE

## 2021-03-02 VITALS — SYSTOLIC BLOOD PRESSURE: 110 MMHG | DIASTOLIC BLOOD PRESSURE: 70 MMHG

## 2021-03-02 VITALS — WEIGHT: 165 LBS | TEMPERATURE: 97.8 F | HEIGHT: 64 IN | BODY MASS INDEX: 28.17 KG/M2

## 2021-03-02 DIAGNOSIS — H65.492 OTHER CHRONIC NONSUPPURATIVE OTITIS MEDIA, LEFT EAR: ICD-10-CM

## 2021-03-02 DIAGNOSIS — Z87.898 PERSONAL HISTORY OF OTHER SPECIFIED CONDITIONS: ICD-10-CM

## 2021-03-02 DIAGNOSIS — R79.89 OTHER SPECIFIED ABNORMAL FINDINGS OF BLOOD CHEMISTRY: ICD-10-CM

## 2021-03-02 DIAGNOSIS — M79.646 PAIN IN UNSPECIFIED FINGER(S): ICD-10-CM

## 2021-03-02 DIAGNOSIS — H65.491 OTHER CHRONIC NONSUPPURATIVE OTITIS MEDIA, RIGHT EAR: ICD-10-CM

## 2021-03-02 DIAGNOSIS — Z87.09 PERSONAL HISTORY OF OTHER DISEASES OF THE RESPIRATORY SYSTEM: ICD-10-CM

## 2021-03-02 PROCEDURE — 36415 COLL VENOUS BLD VENIPUNCTURE: CPT

## 2021-03-02 PROCEDURE — G0403: CPT

## 2021-03-02 PROCEDURE — 99213 OFFICE O/P EST LOW 20 MIN: CPT | Mod: 25

## 2021-03-02 PROCEDURE — G0439: CPT

## 2021-03-02 PROCEDURE — 99072 ADDL SUPL MATRL&STAF TM PHE: CPT

## 2021-03-02 PROCEDURE — G0402 INITIAL PREVENTIVE EXAM: CPT

## 2021-03-02 PROCEDURE — 93000 ELECTROCARDIOGRAM COMPLETE: CPT | Mod: 59

## 2021-03-02 RX ORDER — BENZONATATE 100 MG/1
100 CAPSULE ORAL 3 TIMES DAILY
Qty: 30 | Refills: 1 | Status: DISCONTINUED | COMMUNITY
Start: 2020-02-03 | End: 2021-03-02

## 2021-03-02 RX ORDER — AZITHROMYCIN 250 MG/1
250 TABLET, FILM COATED ORAL
Qty: 1 | Refills: 0 | Status: DISCONTINUED | COMMUNITY
Start: 2020-02-18 | End: 2021-03-02

## 2021-03-02 NOTE — HEALTH RISK ASSESSMENT
[] : No [No] : No [No falls in past year] : Patient reported no falls in the past year [0] : 2) Feeling down, depressed, or hopeless: Not at all (0) [[id number]] : 0 [Fully functional (bathing, dressing, toileting, transferring, walking, feeding)] : Fully functional (bathing, dressing, toileting, transferring, walking, feeding) [Fully functional (using the telephone, shopping, preparing meals, housekeeping, doing laundry, using] : Fully functional and needs no help or supervision to perform IADLs (using the telephone, shopping, preparing meals, housekeeping, doing laundry, using transportation, managing medications and managing finances)

## 2021-03-02 NOTE — HISTORY OF PRESENT ILLNESS
[de-identified] : This is a 70-year-old patient with history of hypertension, hypercholesterolemia, Raynaud's syndrome who is here today for her annual well examination.

## 2021-03-02 NOTE — ASSESSMENT
[FreeTextEntry1] : This is a 70-year-old woman who is here today for her annual well examination\par Problems\par Hypertension\par Hypercholesterolemia\par Raynaud's syndrome\par Assessment\par The patient's blood pressure was well controlled on her present medications.  I spoke to the patient about the importance of exercise and diet in controlling her cholesterol.  I also advised that she take fish oil capsules and garlic capsules.  She still has issues with her hearing which is being followed by her ear nose and throat physician.  I informed her that she is due for her mammography.

## 2021-03-05 LAB
25(OH)D3 SERPL-MCNC: 56.2 NG/ML
ALBUMIN SERPL ELPH-MCNC: 4.7 G/DL
ALP BLD-CCNC: 66 U/L
ALT SERPL-CCNC: 19 U/L
ANION GAP SERPL CALC-SCNC: 14 MMOL/L
APPEARANCE: CLEAR
AST SERPL-CCNC: 21 U/L
BACTERIA: NEGATIVE
BASOPHILS # BLD AUTO: 0.08 K/UL
BASOPHILS NFR BLD AUTO: 1.2 %
BILIRUB SERPL-MCNC: 0.5 MG/DL
BILIRUBIN URINE: NEGATIVE
BLOOD URINE: NEGATIVE
BUN SERPL-MCNC: 15 MG/DL
CALCIUM SERPL-MCNC: 10 MG/DL
CHLORIDE SERPL-SCNC: 102 MMOL/L
CHOLEST SERPL-MCNC: 196 MG/DL
CO2 SERPL-SCNC: 23 MMOL/L
COLOR: NORMAL
CREAT SERPL-MCNC: 0.94 MG/DL
EOSINOPHIL # BLD AUTO: 0.12 K/UL
EOSINOPHIL NFR BLD AUTO: 1.8 %
ESTIMATED AVERAGE GLUCOSE: 114 MG/DL
FOLATE SERPL-MCNC: 13 NG/ML
GLUCOSE QUALITATIVE U: NEGATIVE
GLUCOSE SERPL-MCNC: 101 MG/DL
HBA1C MFR BLD HPLC: 5.6 %
HCT VFR BLD CALC: 52.8 %
HDLC SERPL-MCNC: 75 MG/DL
HGB BLD-MCNC: 17 G/DL
HYALINE CASTS: 0 /LPF
IMM GRANULOCYTES NFR BLD AUTO: 0.5 %
KETONES URINE: NEGATIVE
LDLC SERPL CALC-MCNC: 103 MG/DL
LEUKOCYTE ESTERASE URINE: NEGATIVE
LYMPHOCYTES # BLD AUTO: 1.74 K/UL
LYMPHOCYTES NFR BLD AUTO: 26.5 %
MAN DIFF?: NORMAL
MCHC RBC-ENTMCNC: 29.9 PG
MCHC RBC-ENTMCNC: 32.2 GM/DL
MCV RBC AUTO: 93 FL
MICROSCOPIC-UA: NORMAL
MONOCYTES # BLD AUTO: 0.47 K/UL
MONOCYTES NFR BLD AUTO: 7.2 %
NEUTROPHILS # BLD AUTO: 4.13 K/UL
NEUTROPHILS NFR BLD AUTO: 62.8 %
NITRITE URINE: NEGATIVE
NONHDLC SERPL-MCNC: 121 MG/DL
PH URINE: 6
PLATELET # BLD AUTO: 222 K/UL
POTASSIUM SERPL-SCNC: 4.5 MMOL/L
PROT SERPL-MCNC: 7.4 G/DL
PROTEIN URINE: NEGATIVE
RBC # BLD: 5.68 M/UL
RBC # FLD: 15.1 %
RED BLOOD CELLS URINE: 3 /HPF
SAVE SPECIMEN: NORMAL
SODIUM SERPL-SCNC: 139 MMOL/L
SPECIFIC GRAVITY URINE: 1.02
SQUAMOUS EPITHELIAL CELLS: 2 /HPF
T3RU NFR SERPL: 1.1 TBI
T4 SERPL-MCNC: 8 UG/DL
TRIGL SERPL-MCNC: 90 MG/DL
TSH SERPL-ACNC: 2.41 UIU/ML
URATE SERPL-MCNC: 4.4 MG/DL
UROBILINOGEN URINE: NORMAL
VIT B12 SERPL-MCNC: 421 PG/ML
WBC # FLD AUTO: 6.57 K/UL
WHITE BLOOD CELLS URINE: 2 /HPF

## 2021-10-06 ENCOUNTER — APPOINTMENT (OUTPATIENT)
Dept: OTOLARYNGOLOGY | Facility: CLINIC | Age: 71
End: 2021-10-06
Payer: MEDICARE

## 2021-10-06 VITALS — BODY MASS INDEX: 28.17 KG/M2 | HEIGHT: 64 IN | WEIGHT: 165 LBS | TEMPERATURE: 97.9 F

## 2021-10-06 DIAGNOSIS — H61.23 IMPACTED CERUMEN, BILATERAL: ICD-10-CM

## 2021-10-06 DIAGNOSIS — H90.6 MIXED CONDUCTIVE AND SENSORINEURAL HEARING LOSS, BILATERAL: ICD-10-CM

## 2021-10-06 PROCEDURE — 99213 OFFICE O/P EST LOW 20 MIN: CPT | Mod: 25

## 2021-10-06 PROCEDURE — 69433 CREATE EARDRUM OPENING: CPT | Mod: LT

## 2021-10-06 PROCEDURE — 92504 EAR MICROSCOPY EXAMINATION: CPT | Mod: 59

## 2021-10-07 NOTE — ASSESSMENT
[FreeTextEntry1] : Patient with hx of ETD and conductive hearing loss complains that she cant hear out of her left ear x several months. Had MT placed in 2019 \par \par Bilateral ETD with conductive hearing loss:\par -Right MT in place and intact \par -Left MT placed\par -Pt tolerated well \par \par f/u 2 weeks or prn

## 2021-10-07 NOTE — HISTORY OF PRESENT ILLNESS
[de-identified] : Patient with hx of ETD and conductive hearing loss complains that she cant hear well out of her left ear x several months. She had MT placed in 2019. Feels like her left ear is congested. Pt has no ear pain, ear drainage, tinnitus, vertigo, nasal congestion, nasal discharge, epistaxis, sinus infections, facial pain, facial pressure, throat pain, dysphagia or fevers\par emergency visit-[facility] on friday\par

## 2021-10-07 NOTE — PHYSICAL EXAM
[de-identified] : wax left [de-identified] : Right MT in place and patent   left ome [Midline] : trachea located in midline position [Normal] : no rashes

## 2021-10-07 NOTE — END OF VISIT
[Time Spent: ___ minutes] : I have spent [unfilled] minutes of time on the encounter. [FreeTextEntry3] : I personally saw and examined CHIARA YING in detail. I spoke to BRANDY Gomez regarding the assessment and plan of care. I reviewed the above assessment and plan of care, and agree. I have made changes in changes in the body of the note where appropriate.I personally reviewed the HPI, PMH, FH, SH, ROS and medications/allergies. I have spoken to BRANDY Gomez regarding the history and have personally determined the assessment and plan of care, and documented this myself. I was present and participated in all key portions of the encounter and all procedures noted above. I have made changes in the body of the note where appropriate.\par \par Attesting Faculty: See Attending Signature Below \par \par \par

## 2021-10-07 NOTE — PROCEDURE
[Cerumen Impaction] : Cerumen Impaction [Same] : same as the Pre Op Dx. [] : Removal of Cerumen [FreeTextEntry6] : the microscope was necessary for illumination and magnification\par wax removed\par tube placed -fluid removed and hearing improved

## 2022-02-17 ENCOUNTER — RX RENEWAL (OUTPATIENT)
Age: 72
End: 2022-02-17

## 2022-02-18 ENCOUNTER — NON-APPOINTMENT (OUTPATIENT)
Age: 72
End: 2022-02-18

## 2022-02-18 ENCOUNTER — APPOINTMENT (OUTPATIENT)
Dept: OTOLARYNGOLOGY | Facility: CLINIC | Age: 72
End: 2022-02-18
Payer: MEDICARE

## 2022-02-18 VITALS
SYSTOLIC BLOOD PRESSURE: 126 MMHG | BODY MASS INDEX: 28.17 KG/M2 | DIASTOLIC BLOOD PRESSURE: 80 MMHG | WEIGHT: 165 LBS | HEIGHT: 64 IN | TEMPERATURE: 97.3 F | HEART RATE: 85 BPM

## 2022-02-18 DIAGNOSIS — J34.2 DEVIATED NASAL SEPTUM: ICD-10-CM

## 2022-02-18 DIAGNOSIS — H69.83 OTHER SPECIFIED DISORDERS OF EUSTACHIAN TUBE, BILATERAL: ICD-10-CM

## 2022-02-18 PROCEDURE — 69420 INCISION OF EARDRUM: CPT | Mod: LT

## 2022-02-18 PROCEDURE — 99213 OFFICE O/P EST LOW 20 MIN: CPT | Mod: 25,57

## 2022-02-18 NOTE — PROCEDURE
[Risk and Benefits Discussed] : The purpose, risks, discomforts, benefits and alternatives of the procedure have been explained to the patient including no treatment. [Same] : same as the Pre Op Dx. [] : Myringotomy [FreeTextEntry1] : Dang ETD [FreeTextEntry6] : Left myringoltomy w/o tube performed\par Could not place left M&T due to TM flaccid and retracted

## 2022-02-18 NOTE — END OF VISIT
[FreeTextEntry3] : I personally saw and examined CHIARA YING in detail. I spoke to BRANDY Gomez regarding the assessment and plan of care. I reviewed the above assessment and plan of care, and agree. I have made changes in changes in the body of the note where appropriate.I personally reviewed the HPI, PMH, FH, SH, ROS and medications/allergies. I have spoken to BRANDY Gomez regarding the history and have personally determined the assessment and plan of care, and documented this myself. I was present and participated in all key portions of the encounter and all procedures noted above. I have made changes in the body of the note where appropriate.\par \par Attesting Faculty: See Attending Signature Below \par \par \par  [Time Spent: ___ minutes] : I have spent [unfilled] minutes of time on the encounter.

## 2022-02-18 NOTE — HISTORY OF PRESENT ILLNESS
[M & T] : myringotomy tube [No] : patient does not have a  history of radiation therapy [de-identified] : 72 yo female\par Patient with hx of ETD and conductive hearing loss and BMT -multiple complains that left MT fell out yesterday. She is complaining that left is clogged with muffled hearing. Has mild nasal congestion not using nasal sprays. \par  [Ear Fullness] : ear fullness [Hearing Loss] : hearing loss [Eustachian Tube Dysfunction] : eustachian tube dysfunction [Cholesteatoma] : no cholesteatoma [Early Onset Hearing Loss] : no early onset hearing loss [Stroke] : no stroke [Allergic Rhinitis] : no allergic rhinitis [Adenoidectomy] : no adenoidectomy [Allergies] : no allergies [Asthma] : no asthma [Hyperthyroidism] : no hyperthyroidism [Sialadenitis] : no sialadenitis [Hodgkin Disease] : no hodgkin disease [Non-Hodgkin Lymphoma] : no non-hodgkin lymphoma [None] : No risk factors have been identified. [Graves Disease] : no graves disease [Thyroid Cancer] : no thyroid cancer

## 2022-02-18 NOTE — ASSESSMENT
[FreeTextEntry1] : Patient with hx of ETD and conductive hearing loss complains Left  MT feel out last night, has muffled hearing and clogged ear since. \par \par Bilateral ETD with conductive hearing loss: \par -Left M w/o T, couldn’t place tube\par cleared to fly\par \par -Possible balloon dilation and BMT \par \par f/u w/ Dr Lyles for ET dilation and BMT\par Pt reassurred

## 2022-02-18 NOTE — PHYSICAL EXAM
[Midline] : trachea located in midline position [Normal] : no rashes [de-identified] : wax left [de-identified] : Right TM-wnl  Left TM intact and retracted [] : septum deviated bilaterally

## 2022-03-03 ENCOUNTER — TRANSCRIPTION ENCOUNTER (OUTPATIENT)
Age: 72
End: 2022-03-03

## 2022-03-18 ENCOUNTER — APPOINTMENT (OUTPATIENT)
Dept: INTERNAL MEDICINE | Facility: CLINIC | Age: 72
End: 2022-03-18

## 2022-03-21 ENCOUNTER — APPOINTMENT (OUTPATIENT)
Dept: OBGYN | Facility: CLINIC | Age: 72
End: 2022-03-21
Payer: MEDICARE

## 2022-03-21 ENCOUNTER — RESULT REVIEW (OUTPATIENT)
Age: 72
End: 2022-03-21

## 2022-03-21 PROCEDURE — 82270 OCCULT BLOOD FECES: CPT

## 2022-03-21 PROCEDURE — 99213 OFFICE O/P EST LOW 20 MIN: CPT | Mod: 25

## 2022-03-21 PROCEDURE — 81002 URINALYSIS NONAUTO W/O SCOPE: CPT

## 2022-03-21 PROCEDURE — 99000 SPECIMEN HANDLING OFFICE-LAB: CPT

## 2022-03-29 ENCOUNTER — NON-APPOINTMENT (OUTPATIENT)
Age: 72
End: 2022-03-29

## 2022-03-29 ENCOUNTER — APPOINTMENT (OUTPATIENT)
Dept: INTERNAL MEDICINE | Facility: CLINIC | Age: 72
End: 2022-03-29
Payer: MEDICARE

## 2022-03-29 ENCOUNTER — LABORATORY RESULT (OUTPATIENT)
Age: 72
End: 2022-03-29

## 2022-03-29 VITALS — SYSTOLIC BLOOD PRESSURE: 128 MMHG | DIASTOLIC BLOOD PRESSURE: 78 MMHG

## 2022-03-29 VITALS — BODY MASS INDEX: 30.7 KG/M2 | HEIGHT: 64 IN | WEIGHT: 179.8 LBS

## 2022-03-29 DIAGNOSIS — H90.3 SENSORINEURAL HEARING LOSS, BILATERAL: ICD-10-CM

## 2022-03-29 PROCEDURE — 36415 COLL VENOUS BLD VENIPUNCTURE: CPT

## 2022-03-29 PROCEDURE — 93000 ELECTROCARDIOGRAM COMPLETE: CPT | Mod: 59

## 2022-03-29 PROCEDURE — 99213 OFFICE O/P EST LOW 20 MIN: CPT | Mod: 25

## 2022-03-29 PROCEDURE — G0439: CPT

## 2022-03-29 NOTE — ASSESSMENT
[FreeTextEntry1] : This is a 71-year-old patient with a history of hypertension, hypercholesterolemia, hearing disorder, who is here today for her annual well examination.  Her blood pressure was well controlled her physical examination was normal.  Her EKG did not reveal any acute changes.  I advised the patient to proceed with her mammography.  She is up-to-date with her colonoscopy and her immunizations

## 2022-03-29 NOTE — HISTORY OF PRESENT ILLNESS
[de-identified] : This is a 71 -year-old patient with history of hypertension, hypercholesterolemia, Raynaud's syndrome who is here today for her annual well examination.

## 2022-03-29 NOTE — HEALTH RISK ASSESSMENT
[No] : No [No falls in past year] : Patient reported no falls in the past year [0] : 2) Feeling down, depressed, or hopeless: Not at all (0) [PHQ-2 Negative - No further assessment needed] : PHQ-2 Negative - No further assessment needed [Fully functional (bathing, dressing, toileting, transferring, walking, feeding)] : Fully functional (bathing, dressing, toileting, transferring, walking, feeding) [Fully functional (using the telephone, shopping, preparing meals, housekeeping, doing laundry, using] : Fully functional and needs no help or supervision to perform IADLs (using the telephone, shopping, preparing meals, housekeeping, doing laundry, using transportation, managing medications and managing finances) [[id number]] : 0

## 2022-04-14 LAB
25(OH)D3 SERPL-MCNC: 52.3 NG/ML
ALBUMIN SERPL ELPH-MCNC: 4.5 G/DL
ALP BLD-CCNC: 69 U/L
ALT SERPL-CCNC: 19 U/L
ANION GAP SERPL CALC-SCNC: 13 MMOL/L
APPEARANCE: CLEAR
AST SERPL-CCNC: 21 U/L
BACTERIA: NEGATIVE
BASOPHILS # BLD AUTO: 0.07 K/UL
BASOPHILS # BLD AUTO: 0.07 K/UL
BASOPHILS NFR BLD AUTO: 1 %
BASOPHILS NFR BLD AUTO: 1.1 %
BILIRUB SERPL-MCNC: 0.3 MG/DL
BILIRUBIN URINE: NEGATIVE
BLOOD URINE: NEGATIVE
BUN SERPL-MCNC: 12 MG/DL
CALCIUM SERPL-MCNC: 10.1 MG/DL
CHLORIDE SERPL-SCNC: 102 MMOL/L
CHOLEST SERPL-MCNC: 177 MG/DL
CO2 SERPL-SCNC: 25 MMOL/L
COLOR: NORMAL
CREAT SERPL-MCNC: 0.88 MG/DL
EGFR: 71 ML/MIN/1.73M2
EOSINOPHIL # BLD AUTO: 0.1 K/UL
EOSINOPHIL # BLD AUTO: 0.11 K/UL
EOSINOPHIL NFR BLD AUTO: 1.5 %
EOSINOPHIL NFR BLD AUTO: 1.6 %
EPO SERPL-MCNC: 16.5 MIU/ML
ESTIMATED AVERAGE GLUCOSE: 111 MG/DL
FERRITIN SERPL-MCNC: 84 NG/ML
FOLATE SERPL-MCNC: 12.8 NG/ML
GLUCOSE QUALITATIVE U: NEGATIVE
GLUCOSE SERPL-MCNC: 101 MG/DL
HBA1C MFR BLD HPLC: 5.5 %
HCT VFR BLD CALC: 49 %
HCT VFR BLD CALC: 49.2 %
HDLC SERPL-MCNC: 70 MG/DL
HGB BLD-MCNC: 15.8 G/DL
HGB BLD-MCNC: 15.9 G/DL
HYALINE CASTS: 0 /LPF
IMM GRANULOCYTES NFR BLD AUTO: 0.3 %
IMM GRANULOCYTES NFR BLD AUTO: 0.3 %
KETONES URINE: NEGATIVE
LDLC SERPL CALC-MCNC: 71 MG/DL
LEUKOCYTE ESTERASE URINE: NEGATIVE
LYMPHOCYTES # BLD AUTO: 1.99 K/UL
LYMPHOCYTES # BLD AUTO: 2.27 K/UL
LYMPHOCYTES NFR BLD AUTO: 30.9 %
LYMPHOCYTES NFR BLD AUTO: 31.4 %
MAN DIFF?: NORMAL
MAN DIFF?: NORMAL
MCHC RBC-ENTMCNC: 29.5 PG
MCHC RBC-ENTMCNC: 29.9 PG
MCHC RBC-ENTMCNC: 32.1 GM/DL
MCHC RBC-ENTMCNC: 32.4 GM/DL
MCV RBC AUTO: 91.8 FL
MCV RBC AUTO: 92.1 FL
MICROSCOPIC-UA: NORMAL
MONOCYTES # BLD AUTO: 0.49 K/UL
MONOCYTES # BLD AUTO: 0.62 K/UL
MONOCYTES NFR BLD AUTO: 7.7 %
MONOCYTES NFR BLD AUTO: 8.4 %
NEUTROPHILS # BLD AUTO: 3.66 K/UL
NEUTROPHILS # BLD AUTO: 4.26 K/UL
NEUTROPHILS NFR BLD AUTO: 57.9 %
NEUTROPHILS NFR BLD AUTO: 57.9 %
NITRITE URINE: NEGATIVE
NONHDLC SERPL-MCNC: 108 MG/DL
PH URINE: 5.5
PLATELET # BLD AUTO: 236 K/UL
PLATELET # BLD AUTO: 249 K/UL
POTASSIUM SERPL-SCNC: 4.3 MMOL/L
POTASSIUM SERPL-SCNC: 4.5 MMOL/L
POTASSIUM SERPL-SCNC: 5.7 MMOL/L
POTASSIUM SERPL-SCNC: 5.8 MMOL/L
POTASSIUM SERPL-SCNC: 5.8 MMOL/L
PROT SERPL-MCNC: 7.4 G/DL
PROTEIN URINE: NEGATIVE
RBC # BLD: 5.32 M/UL
RBC # BLD: 5.36 M/UL
RBC # FLD: 13.9 %
RBC # FLD: 14.3 %
RED BLOOD CELLS URINE: 1 /HPF
SODIUM SERPL-SCNC: 140 MMOL/L
SPECIFIC GRAVITY URINE: 1.01
SQUAMOUS EPITHELIAL CELLS: 1 /HPF
T3RU NFR SERPL: 1.1 TBI
T4 SERPL-MCNC: 7.9 UG/DL
TRIGL SERPL-MCNC: 183 MG/DL
TSH SERPL-ACNC: 2.77 UIU/ML
URATE SERPL-MCNC: 4.4 MG/DL
UROBILINOGEN URINE: NORMAL
VIT B12 SERPL-MCNC: 378 PG/ML
WBC # FLD AUTO: 6.33 K/UL
WBC # FLD AUTO: 7.35 K/UL
WHITE BLOOD CELLS URINE: 1 /HPF

## 2023-04-18 ENCOUNTER — APPOINTMENT (OUTPATIENT)
Dept: OBGYN | Facility: CLINIC | Age: 73
End: 2023-04-18
Payer: MEDICARE

## 2023-04-18 PROCEDURE — 99213 OFFICE O/P EST LOW 20 MIN: CPT | Mod: 25

## 2023-04-18 PROCEDURE — G0101: CPT

## 2023-04-18 PROCEDURE — 82270 OCCULT BLOOD FECES: CPT

## 2023-04-20 ENCOUNTER — RX RENEWAL (OUTPATIENT)
Age: 73
End: 2023-04-20

## 2023-05-09 ENCOUNTER — NON-APPOINTMENT (OUTPATIENT)
Age: 73
End: 2023-05-09

## 2023-05-09 ENCOUNTER — LABORATORY RESULT (OUTPATIENT)
Age: 73
End: 2023-05-09

## 2023-05-09 ENCOUNTER — APPOINTMENT (OUTPATIENT)
Dept: INTERNAL MEDICINE | Facility: CLINIC | Age: 73
End: 2023-05-09
Payer: MEDICARE

## 2023-05-09 VITALS — HEIGHT: 64 IN | BODY MASS INDEX: 29.53 KG/M2 | WEIGHT: 173 LBS

## 2023-05-09 DIAGNOSIS — I73.00 RAYNAUD'S SYNDROME W/OUT GANGRENE: ICD-10-CM

## 2023-05-09 DIAGNOSIS — Z00.00 ENCOUNTER FOR GENERAL ADULT MEDICAL EXAMINATION W/OUT ABNORMAL FINDINGS: ICD-10-CM

## 2023-05-09 DIAGNOSIS — E78.00 PURE HYPERCHOLESTEROLEMIA, UNSPECIFIED: ICD-10-CM

## 2023-05-09 PROCEDURE — G0439: CPT

## 2023-05-09 PROCEDURE — 36415 COLL VENOUS BLD VENIPUNCTURE: CPT

## 2023-05-09 PROCEDURE — 93000 ELECTROCARDIOGRAM COMPLETE: CPT | Mod: 59

## 2023-05-09 NOTE — ASSESSMENT
[FreeTextEntry1] : This is a 72-year-old patient with a history of hypertension and hypercholesterolemia who is here today for her annual well examination.  She feels well and denies any complaints except for her urine blood pressure and physical examination was normal.  The patient was referred for evaluation mammogram bone density test and colonoscopy\par In addition an MRI of the left hip was ordered.  We will refer her to cardiologist.  On last visit her hemoglobin was elevated I referred her to hematology but she did not go repeat CBC was obtained today and I will start the patient on Cymbalta 20 mg daily NEURO: Anxiolytic therapy on precedex, xanax, atarax.  Improvement anxiety. clonidine for weaning precedex. Pain control as needed, Dilaudid 0.5mg IVP ivory dressing change.    CV: No active issues  RESP: Respiratory Failure s/p trach, remains on minimal vent settings, CPAP as tolerated   RENAL: Monitor lytes, replace as needed.   GI: NPO with tf, having diarrhea.  Change in tf for higher fiber content.  PEG this afternoon.  ENDO: No active issues.   ID: Cellulitis, MRSA bacteremia/UTI on doxy and fluconazole, ID following.   HEME: Lovenox for ac  DISPO: full code.

## 2023-05-09 NOTE — HISTORY OF PRESENT ILLNESS
[de-identified] : This is a 72 -year-old patient with history of hypertension, hypercholesterolemia, Raynaud's syndrome who is here today for her annual well examination.  The patient's main complaint today is back pain with radiculopathy.  Her last MRI revealed significant lumbar disc disease osteophytes and foraminal stenosis and she is complaining of chronic left hip pain.

## 2023-05-09 NOTE — HEALTH RISK ASSESSMENT
[No] : No [No falls in past year] : Patient reported no falls in the past year [0] : 2) Feeling down, depressed, or hopeless: Not at all (0) [PHQ-2 Negative - No further assessment needed] : PHQ-2 Negative - No further assessment needed [Fully functional (bathing, dressing, toileting, transferring, walking, feeding)] : Fully functional (bathing, dressing, toileting, transferring, walking, feeding) [Fully functional (using the telephone, shopping, preparing meals, housekeeping, doing laundry, using] : Fully functional and needs no help or supervision to perform IADLs (using the telephone, shopping, preparing meals, housekeeping, doing laundry, using transportation, managing medications and managing finances) [Never] : Never [[id number]] : 0

## 2023-05-11 LAB
ALBUMIN SERPL ELPH-MCNC: 4.4 G/DL
ALP BLD-CCNC: 69 U/L
ALT SERPL-CCNC: 20 U/L
ANION GAP SERPL CALC-SCNC: 12 MMOL/L
AST SERPL-CCNC: 20 U/L
BASOPHILS # BLD AUTO: 0.06 K/UL
BASOPHILS NFR BLD AUTO: 0.8 %
BILIRUB SERPL-MCNC: 0.4 MG/DL
BUN SERPL-MCNC: 13 MG/DL
CALCIUM SERPL-MCNC: 10.1 MG/DL
CHLORIDE SERPL-SCNC: 102 MMOL/L
CHOLEST SERPL-MCNC: 186 MG/DL
CO2 SERPL-SCNC: 25 MMOL/L
CREAT SERPL-MCNC: 0.85 MG/DL
EGFR: 74 ML/MIN/1.73M2
EOSINOPHIL # BLD AUTO: 0.09 K/UL
EOSINOPHIL NFR BLD AUTO: 1.1 %
ESTIMATED AVERAGE GLUCOSE: 105 MG/DL
FERRITIN SERPL-MCNC: 95 NG/ML
FOLATE SERPL-MCNC: 11.8 NG/ML
GLUCOSE SERPL-MCNC: 100 MG/DL
HBA1C MFR BLD HPLC: 5.3 %
HCT VFR BLD CALC: 50.7 %
HDLC SERPL-MCNC: 67 MG/DL
HGB BLD-MCNC: 16 G/DL
IMM GRANULOCYTES NFR BLD AUTO: 0.3 %
LDLC SERPL CALC-MCNC: 91 MG/DL
LYMPHOCYTES # BLD AUTO: 2.4 K/UL
LYMPHOCYTES NFR BLD AUTO: 30.4 %
MAN DIFF?: NORMAL
MCHC RBC-ENTMCNC: 29.6 PG
MCHC RBC-ENTMCNC: 31.6 GM/DL
MCV RBC AUTO: 93.7 FL
MONOCYTES # BLD AUTO: 0.6 K/UL
MONOCYTES NFR BLD AUTO: 7.6 %
NEUTROPHILS # BLD AUTO: 4.72 K/UL
NEUTROPHILS NFR BLD AUTO: 59.8 %
NONHDLC SERPL-MCNC: 119 MG/DL
PLATELET # BLD AUTO: 236 K/UL
POTASSIUM SERPL-SCNC: 4.4 MMOL/L
PROT SERPL-MCNC: 7 G/DL
RBC # BLD: 5.41 M/UL
RBC # FLD: 14.2 %
SODIUM SERPL-SCNC: 139 MMOL/L
T3RU NFR SERPL: 1 TBI
T4 SERPL-MCNC: 7.5 UG/DL
TRIGL SERPL-MCNC: 137 MG/DL
TSH SERPL-ACNC: 2.48 UIU/ML
URATE SERPL-MCNC: 3.9 MG/DL
VIT B12 SERPL-MCNC: 320 PG/ML
WBC # FLD AUTO: 7.89 K/UL

## 2023-05-12 ENCOUNTER — NON-APPOINTMENT (OUTPATIENT)
Age: 73
End: 2023-05-12

## 2023-05-15 ENCOUNTER — APPOINTMENT (OUTPATIENT)
Dept: MRI IMAGING | Facility: CLINIC | Age: 73
End: 2023-05-15
Payer: MEDICARE

## 2023-05-15 ENCOUNTER — APPOINTMENT (OUTPATIENT)
Dept: CARDIOLOGY | Facility: CLINIC | Age: 73
End: 2023-05-15
Payer: MEDICARE

## 2023-05-15 ENCOUNTER — OUTPATIENT (OUTPATIENT)
Dept: OUTPATIENT SERVICES | Facility: HOSPITAL | Age: 73
LOS: 1 days | End: 2023-05-15
Payer: MEDICARE

## 2023-05-15 VITALS
SYSTOLIC BLOOD PRESSURE: 104 MMHG | HEIGHT: 64 IN | DIASTOLIC BLOOD PRESSURE: 71 MMHG | HEART RATE: 85 BPM | BODY MASS INDEX: 29.53 KG/M2 | WEIGHT: 173 LBS

## 2023-05-15 VITALS — OXYGEN SATURATION: 100 %

## 2023-05-15 DIAGNOSIS — M16.9 OSTEOARTHRITIS OF HIP, UNSPECIFIED: ICD-10-CM

## 2023-05-15 DIAGNOSIS — R94.31 ABNORMAL ELECTROCARDIOGRAM [ECG] [EKG]: ICD-10-CM

## 2023-05-15 DIAGNOSIS — R00.2 PALPITATIONS: ICD-10-CM

## 2023-05-15 DIAGNOSIS — R07.9 CHEST PAIN, UNSPECIFIED: ICD-10-CM

## 2023-05-15 PROCEDURE — 73721 MRI JNT OF LWR EXTRE W/O DYE: CPT | Mod: 26,LT,MH

## 2023-05-15 PROCEDURE — 99204 OFFICE O/P NEW MOD 45 MIN: CPT

## 2023-05-15 PROCEDURE — 73721 MRI JNT OF LWR EXTRE W/O DYE: CPT

## 2023-05-17 ENCOUNTER — APPOINTMENT (OUTPATIENT)
Dept: CARDIOLOGY | Facility: CLINIC | Age: 73
End: 2023-05-17
Payer: MEDICARE

## 2023-05-17 PROCEDURE — 93306 TTE W/DOPPLER COMPLETE: CPT

## 2023-05-19 ENCOUNTER — RX RENEWAL (OUTPATIENT)
Age: 73
End: 2023-05-19

## 2023-05-25 ENCOUNTER — RX RENEWAL (OUTPATIENT)
Age: 73
End: 2023-05-25

## 2023-06-01 ENCOUNTER — OUTPATIENT (OUTPATIENT)
Dept: OUTPATIENT SERVICES | Facility: HOSPITAL | Age: 73
LOS: 1 days | Discharge: HOME | End: 2023-06-01

## 2023-06-01 DIAGNOSIS — D58.2 OTHER HEMOGLOBINOPATHIES: ICD-10-CM

## 2023-06-03 LAB
APPEARANCE: CLEAR
BACTERIA: NEGATIVE /HPF
BILIRUBIN URINE: NEGATIVE
BLOOD URINE: NEGATIVE
CAST: 0 /LPF
COLOR: YELLOW
EPITHELIAL CELLS: 2 /HPF
GLUCOSE QUALITATIVE U: NEGATIVE MG/DL
KETONES URINE: NEGATIVE MG/DL
LEUKOCYTE ESTERASE URINE: ABNORMAL
MICROSCOPIC-UA: NORMAL
NITRITE URINE: NEGATIVE
PH URINE: 5.5
PROTEIN URINE: NEGATIVE MG/DL
RED BLOOD CELLS URINE: 0 /HPF
SPECIFIC GRAVITY URINE: 1.02
UROBILINOGEN URINE: 0.2 MG/DL
WHITE BLOOD CELLS URINE: 26 /HPF

## 2023-06-13 ENCOUNTER — NON-APPOINTMENT (OUTPATIENT)
Age: 73
End: 2023-06-13

## 2023-06-14 ENCOUNTER — RESULT REVIEW (OUTPATIENT)
Age: 73
End: 2023-06-14

## 2023-06-14 ENCOUNTER — APPOINTMENT (OUTPATIENT)
Dept: HEMATOLOGY ONCOLOGY | Facility: CLINIC | Age: 73
End: 2023-06-14
Payer: MEDICARE

## 2023-06-14 ENCOUNTER — NON-APPOINTMENT (OUTPATIENT)
Age: 73
End: 2023-06-14

## 2023-06-14 ENCOUNTER — APPOINTMENT (OUTPATIENT)
Dept: ORTHOPEDIC SURGERY | Facility: CLINIC | Age: 73
End: 2023-06-14
Payer: MEDICARE

## 2023-06-14 VITALS
HEIGHT: 64 IN | SYSTOLIC BLOOD PRESSURE: 126 MMHG | WEIGHT: 173 LBS | DIASTOLIC BLOOD PRESSURE: 81 MMHG | HEART RATE: 80 BPM | RESPIRATION RATE: 16 BRPM | TEMPERATURE: 97.2 F | OXYGEN SATURATION: 99 % | BODY MASS INDEX: 29.53 KG/M2

## 2023-06-14 VITALS — BODY MASS INDEX: 29.53 KG/M2 | HEIGHT: 64 IN | WEIGHT: 173 LBS

## 2023-06-14 LAB
BASOPHILS # BLD AUTO: 0.06 K/UL — SIGNIFICANT CHANGE UP (ref 0–0.2)
BASOPHILS NFR BLD AUTO: 0.9 % — SIGNIFICANT CHANGE UP (ref 0–2)
EOSINOPHIL # BLD AUTO: 0.04 K/UL — SIGNIFICANT CHANGE UP (ref 0–0.5)
EOSINOPHIL NFR BLD AUTO: 0.6 % — SIGNIFICANT CHANGE UP (ref 0–6)
HCT VFR BLD CALC: 46.7 % — HIGH (ref 34.5–45)
HGB BLD-MCNC: 15.3 G/DL — SIGNIFICANT CHANGE UP (ref 11.5–15.5)
IMM GRANULOCYTES NFR BLD AUTO: 0.4 % — SIGNIFICANT CHANGE UP (ref 0–0.9)
LYMPHOCYTES # BLD AUTO: 1.67 K/UL — SIGNIFICANT CHANGE UP (ref 1–3.3)
LYMPHOCYTES # BLD AUTO: 24.2 % — SIGNIFICANT CHANGE UP (ref 13–44)
MCHC RBC-ENTMCNC: 29.5 PG — SIGNIFICANT CHANGE UP (ref 27–34)
MCHC RBC-ENTMCNC: 32.8 G/DL — SIGNIFICANT CHANGE UP (ref 32–36)
MCV RBC AUTO: 90.2 FL — SIGNIFICANT CHANGE UP (ref 80–100)
MONOCYTES # BLD AUTO: 0.49 K/UL — SIGNIFICANT CHANGE UP (ref 0–0.9)
MONOCYTES NFR BLD AUTO: 7.1 % — SIGNIFICANT CHANGE UP (ref 2–14)
NEUTROPHILS # BLD AUTO: 4.61 K/UL — SIGNIFICANT CHANGE UP (ref 1.8–7.4)
NEUTROPHILS NFR BLD AUTO: 66.8 % — SIGNIFICANT CHANGE UP (ref 43–77)
NRBC # BLD: 0 /100 WBCS — SIGNIFICANT CHANGE UP (ref 0–0)
PLATELET # BLD AUTO: 203 K/UL — SIGNIFICANT CHANGE UP (ref 150–400)
RBC # BLD: 5.18 M/UL — SIGNIFICANT CHANGE UP (ref 3.8–5.2)
RBC # FLD: 13.2 % — SIGNIFICANT CHANGE UP (ref 10.3–14.5)
WBC # BLD: 6.9 K/UL — SIGNIFICANT CHANGE UP (ref 3.8–10.5)
WBC # FLD AUTO: 6.9 K/UL — SIGNIFICANT CHANGE UP (ref 3.8–10.5)

## 2023-06-14 PROCEDURE — 99205 OFFICE O/P NEW HI 60 MIN: CPT

## 2023-06-14 PROCEDURE — 99204 OFFICE O/P NEW MOD 45 MIN: CPT

## 2023-06-14 PROCEDURE — 73522 X-RAY EXAM HIPS BI 3-4 VIEWS: CPT

## 2023-06-14 PROCEDURE — 72100 X-RAY EXAM L-S SPINE 2/3 VWS: CPT

## 2023-06-14 PROCEDURE — G2212 PROLONG OUTPT/OFFICE VIS: CPT

## 2023-06-14 NOTE — PHYSICAL EXAM
[de-identified] : Constitutional - the patient is of normal build and nutrition.  The patient remains oriented to person, time, and  place.  Mood is normal. Vital signs as recorded.  The patients gait is with a limp of her left hip. The patient has satisfactory  balance and can stand on toes and heels.\par \par The patient has no difficulty with respiration. Respiration at rest is a normal rate. The patient is not short of breath and has not become short of breath with short ambulation. There is no audible wheezing. No coughing.\par \par Skin is normal for the patient's age. There are no abnormal masses or lymph nodes which stand out in the lower extremities.\par \par Spine - deep tendon reflexes are symmetric. Motor and sensory are symmetric.  She does have pain across her low back as well as pain radiating into her left buttocks.  Beside that she has a separate pain in the left groin.  The buttocks pain appears to be radicular from her back in the groin pain more from her hip.\par \par \par UPPER EXTREMITIES \par \par Shoulders ROM  is symmetric  and the motion is satisfactory.  There is no significant shoulder pain or limitation in motion which would make using a cane or a walker difficult. Shoulder stability and  strength are satisfactory.\par \par There is normal motion in the wrists and elbows.\par \par Circulation appears satisfactory with pedal pulses present.  There is no major edema in the lower legs. No skin tenderness or increased temperature. No major varicosities.\par \par HIP EXAMINATION the abduction and abduction as well as rotation measurements were taken with the hip in flexion.\par \par Motion\par She does have pain at the extremities of motion when examining her left hip and she has decreased motion of the left as compared to the right she has flexion right hip 140 left hip 120, abduction right hip 85 left hip 60, adduction right hip 45 left hip 20, external rotation right hip 80 left hip 40 internal rotation right hip 35 left hip 10.  Again there is discomfort with motion in the left hip this is in the groin different from the pain in the buttocks that she occasionally feels which may well be from her back.\par \par Guards the strength in her left hip.   There is no crepitus in either hip. The alignment of the hips is normal.\par \par \par KNEE EXAMINATION\par \par Motion\par Right Knee has 0 to 135 degrees of motion with good medial  lateral and anterior posterior stability.  There is no major effusion.  There is no Baker's cyst.  There is no significant patellofemoral crepitus.  The patient has satisfactory strength across the knee.               \par Left  Knee   has 0 to 135 degrees of motion with good medial lateral and anterior posterior stability.  There is no major effusion there is no Baker's cyst.  There is no significant patellofemoral crepitus.  The patient has satisfactory strength across the knee.            \par \par Ankle and foot examination\par Of the ankle has normal motion.  There is normal ankle stability.  The patient has no major abnormalities of the foot.\par \par \par \par  [de-identified] : EXAM: [id number] - MR HIP LT - ORDERED BY: ARISTIDES JUAREZ\par \par PROCEDURE DATE: 05/15/2023\par \par INTERPRETATION: MR HIP LEFT\par \par HISTORY: Left hip pain. Osteoarthritis. Pain for 2 years. No injury.\par \par TECHNIQUE: Multiplanar MRI of the left hip was performed without contrast.\par \par COMPARISON: CT abdomen and pelvis dated 4/18/2018.\par \par FINDINGS:\par \par OSSEOUS STRUCTURES\par  Fractures/Stress Reactions: None.\par \par HIP JOINT\par  Morphology: Normal.\par  Avascular Necrosis: None.\par  Articular Cartilage: Severe cartilage loss is present anterosuperiorly with anterior decentering of the femoral head, subchondral reactive changes, and moderate-sized osteophytes.\par  Acetabular Labrum: Anterior and superior labrum are degenerated and torn.\par  Effusion/Synovitis: Moderate to severe synovial thickening appears to be present.\par \par TENDONS\par  Gluteus Minimus Tendon: Intact.\par  Gluteus Medius Tendon: Intact.\par  Iliopsoas Tendon: Intact.\par  Common Hamstring Tendon: There is slight intrasubstance tearing of the hamstring origin.\par  Bursa: Trace fluid is noted within the iliopsoas bursa.\par \par SOFT TISSUE\par  Pelvis: Colonic diverticuli are present without appreciated inflammatory change.\par  Musculature: Unremarkable.\par  Subcutaneous Tissues: Unremarkable.\par \par NEUROVASCULAR STRUCTURES\par  Sciatic nerve: Preserved.\par \par IMPRESSION:\par 1. Severe osteoarthritis is present with moderate to severe synovitis.\par \par CHIVO LUCIA MD; Attending Radiologist\par This document has been electronically signed. May 18 2023 9:08PM\par \par \par An AP of the pelvis and lateral of the right left hip were done the right femoral head is round joint space maintained there is no evidence of osteoarthritis by x-ray her left hip does show marked decreased joint space superiorly probable bone against bone against the superior aspect of femoral head and acetabulum with increased subchondral sclerosis of the acetabulum all consistent with significant degeneration of the left hip with arthritis.\par \par An AP and lateral of the lumbar spine shows patient with a curvature convexity to the right in the lumbar area on the AP view the lateral view shows significant degenerative disks in the lower lumbar area especially L3-4 L4-5 and L5-S1.  There may well be significant degenerative changes in the lumbar facets.

## 2023-06-14 NOTE — DISCUSSION/SUMMARY
[de-identified] : I feel the patient is to problems but she has been very active and able to still do well most of her daily routine.  She will watch her left hip and she will take oral anti-inflammatory which she prefers ibuprofen up to 600 mg 3 times a day and may supplement with Tylenol up to 3 g a day.  She will follow conservative back measures as far as her hip she would planning to take a trip to [facility] in the near future would like to defer any treatment till after that point.  Long discussion was had again about total hip replacement and the risk and benefits of that procedure.  Return visit in 2 to 3 months.

## 2023-06-14 NOTE — HISTORY OF PRESENT ILLNESS
[de-identified] : CHIARA YING is a 72 year woman with PMH of HTN, HLD, and sciatica, who presents for Hematology evaluation of erythrocytosis.\par \par She has erythrocytosis (peak RBC 5.68, Hgb 17.0, Hct 52.8) dating back to at least 2018. Her most recent CBC on 5/9/23 shows RBC 5.41, Hgb 16, and Hct 50.7, with normal WBC and platelet count. She has normal CMP, iron studies, B12, and TSH. EPO level in 4/2022 was normal (16.5). She denies history of lung disease, tobacco use, or snoring. \par \par Family History:\par - Father with CAD\par - Mother with dementia\par - No history of cancer or hematologic disorders\par \par Social History:\par - Lives with her \par - Retired, previously worked in early childhood\par - Denies tobacco, alcohol, or drug use. \par \par Review of Systems:\par Constitutional: Denies fever/chills, night sweats, unintentional weight loss, lymphadenopathy, fatigue\par HEENT: Denies vision or hearing changes\par Cardiovascular: Denies chest pain and palpitations\par Respiratory: Denies shortness of breath, cough, dyspnea on exertion\par GI: Denies nausea, vomiting, diarrhea, constipation, or abdominal pain\par : Denies urinary frequency or dysuria\par Hematologic: Denies easy bruising or bleeding, epistaxis, gingival bleeding, hematemesis, hematochezia, melena, or hematuria\par Musculoskeletal: Denies muscle/joint pain or weakness\par Neurologic: Denies headaches, weakness, numbness\par Skin: Denies rash and pruritis\par Psych: Denies recent changes in mood

## 2023-06-14 NOTE — ASSESSMENT
[FreeTextEntry1] : CHIARA YING is a 72 year woman with PMH of HTN, HLD, and sciatica, who presents for Hematology evaluation of erythrocytosis.\par \par # Erythrocytosis: She has erythrocytosis (peak RBC 5.68, Hgb 17.0, Hct 52.8) dating back to at least 2018. Her most recent CBC on 5/9/23 shows RBC 5.41, Hgb 16, and Hct 50.7, with normal WBC and platelet count. She has normal CMP, iron studies, B12, and TSH. EPO level in 4/2022 was normal (16.5). She denies history of lung disease, tobacco use, or snoring. Most likely reactive given prior normal EPO level.\par - CBC and CMP\par - Repeat EPO\par - JAK2 testing\par - If suspicious for reactive erythrocytosis, will consider sleep study to evaluate for FIDENCIO\par - Pending lab results, will arrange follow up as clinically indicated

## 2023-06-14 NOTE — HISTORY OF PRESENT ILLNESS
[de-identified] : Ms. CHIARA YING is a 72 year female who presents to office complaining of left hip pain chronic in nature.\par Denies specific injury, trauma, or fall.\par Pain is described as a constant dull/achy pain localized to her anterior hip/groin and also present in her left sided lower back.\par Pain is aggravated with getting up from seated positions and standing/walking for long periods of time.\par She sees a pain management doctor for her back pain, periodically managed with epidural injections.\par She also takes Advil PRN, which does not seem to help.\par Denies associated numbness, tingling, or weakness of her lower extremities.\par All review of systems, family history, social history, surgical history, past medical history, medications, and allergies not previously stated as positive are negative. They were reviewed by me today with the patient and documented accordingly.

## 2023-06-15 ENCOUNTER — APPOINTMENT (OUTPATIENT)
Dept: CARDIOLOGY | Facility: CLINIC | Age: 73
End: 2023-06-15
Payer: MEDICARE

## 2023-06-15 ENCOUNTER — NON-APPOINTMENT (OUTPATIENT)
Age: 73
End: 2023-06-15

## 2023-06-15 LAB
ALBUMIN SERPL ELPH-MCNC: 4.4 G/DL
ALP BLD-CCNC: 78 U/L
ALT SERPL-CCNC: 17 U/L
ANION GAP SERPL CALC-SCNC: 13 MMOL/L
AST SERPL-CCNC: 22 U/L
BILIRUB SERPL-MCNC: 0.4 MG/DL
BUN SERPL-MCNC: 11 MG/DL
CALCIUM SERPL-MCNC: 9.7 MG/DL
CHLORIDE SERPL-SCNC: 102 MMOL/L
CO2 SERPL-SCNC: 24 MMOL/L
CREAT SERPL-MCNC: 0.86 MG/DL
EGFR: 73 ML/MIN/1.73M2
EPO SERPL-MCNC: 13.4 MIU/ML
FERRITIN SERPL-MCNC: 95 NG/ML
GLUCOSE SERPL-MCNC: 103 MG/DL
IRON SATN MFR SERPL: 20 %
IRON SERPL-MCNC: 57 UG/DL
POTASSIUM SERPL-SCNC: 4.3 MMOL/L
PROT SERPL-MCNC: 7.1 G/DL
SODIUM SERPL-SCNC: 139 MMOL/L
TIBC SERPL-MCNC: 284 UG/DL
UIBC SERPL-MCNC: 228 UG/DL
VIT B12 SERPL-MCNC: 360 PG/ML

## 2023-06-15 PROCEDURE — 93351 STRESS TTE COMPLETE: CPT

## 2023-06-16 NOTE — PHYSICAL EXAM
[General Appearance - Well Developed] : well developed [Normal Appearance] : normal appearance [Well Groomed] : well groomed [General Appearance - Well Nourished] : well nourished [No Deformities] : no deformities [General Appearance - In No Acute Distress] : no acute distress [Normal Conjunctiva] : the conjunctiva exhibited no abnormalities [Eyelids - No Xanthelasma] : the eyelids demonstrated no xanthelasmas [Normal Oral Mucosa] : normal oral mucosa [No Oral Pallor] : no oral pallor [No Oral Cyanosis] : no oral cyanosis [Normal Jugular Venous A Waves Present] : normal jugular venous A waves present [Normal Jugular Venous V Waves Present] : normal jugular venous V waves present [No Jugular Venous Lazar A Waves] : no jugular venous lazar A waves [Heart Rate And Rhythm] : heart rate and rhythm were normal [Heart Sounds] : normal S1 and S2 [Murmurs] : no murmurs present [Respiration, Rhythm And Depth] : normal respiratory rhythm and effort [Exaggerated Use Of Accessory Muscles For Inspiration] : no accessory muscle use [Auscultation Breath Sounds / Voice Sounds] : lungs were clear to auscultation bilaterally [Abdomen Soft] : soft [Abdomen Tenderness] : non-tender [Abdomen Mass (___ Cm)] : no abdominal mass palpated [Abnormal Walk] : normal gait [Gait - Sufficient For Exercise Testing] : the gait was sufficient for exercise testing [Nail Clubbing] : no clubbing of the fingernails [Cyanosis, Localized] : no localized cyanosis [Petechial Hemorrhages (___cm)] : no petechial hemorrhages [Skin Color & Pigmentation] : normal skin color and pigmentation [] : no rash [No Venous Stasis] : no venous stasis [Skin Lesions] : no skin lesions [No Skin Ulcers] : no skin ulcer [No Xanthoma] : no  xanthoma was observed [Oriented To Time, Place, And Person] : oriented to person, place, and time [Affect] : the affect was normal [Mood] : the mood was normal [No Anxiety] : not feeling anxious [FreeTextEntry1] : No edema. Pulses 2+ bilaterally, symmetric, including pedal

## 2023-06-16 NOTE — CARDIOLOGY SUMMARY
[de-identified] : Maira 15, 2023.  Exercised 4 minutes to a heart rate of 153 and a blood pressure of 166/86.  Heart rate actually reached 150 and stage I.  Shortness of breath but no chest pain.  No ST changes.  No significant arrhythmias.  No echocardiographic evidence of ischemia [de-identified] : May 17, 2023.  Normal mitral valve with mild S.A.M., possibly chordal.  No significant LVOT gradient.  Minimal MR.  Normal trileaflet aortic valve with no AI.  Normal left atrium.  Normal LV size and systolic function with LVEF 69%.  Early findings of diastolic function.  Normal RV size and function with mild TR.  RVSP 32.  Normal pericardium with no effusion.

## 2023-06-16 NOTE — HISTORY OF PRESENT ILLNESS
[FreeTextEntry1] : May 15, 2018. Patient has been healthy all her life, although she has not gone to a doctor, probably in 15 years other than GYN and she does no exercise. Her main complaint is palpitations which are infrequent, but not rare. Mostly coming when she is not distracted such as at night in bed and while driving. She feels them in her throat and her abdomen. She can feel them for 30 seconds at a time but it does not sound like she is having a sustained arrhythmia. Never had syncope or near syncope. No exertional chest pain or shortness of breath. Lab work one month ago had a cholesterol of 248, triglycerides 197, HDL 55, . Normal thyroid function. Only other lab abnormality was an elevated hemoglobin of 15.7, and a hematocrit of 48.4. The patient had an enlarged liver, which turned out to be just some calcified hepatic granulomas. She has some back problems with disc disease and will probably have an epidural steroid injection. We'll not exercising. She does keep active carrying grandchildren up and down going shopping, etc. without exertional chest pain or shortness of breath. Her father had an MI in his 60s and then bypass at 82, but he was a smoker until his 60s. (When the patient returned in 2023 she mentioned that after her father passed away her mother had a classic case of Takotsubo syndrome.)  Siblings have no heart disease. (Her sister ended up having a calcium score of 0 in 2022).  She has no hypertension or diabetes. Her hemoglobin A1c was 5.4. She thinks about 17 years ago she was having panic attacks and saw a cardiologist, who did a stress test and echo that were normal\par \par May 15, 2023.  Exactly 5 years since her evaluation here.  Dr. Montelongo felt it was time to see me.  In the interim she had seen Dr. Mick Parker who diagnosed her with Raynaud's and a very mild case of Sjogren's syndrome.  She remains on amlodipine 2.5 and pentoxifylline.  She is still on atorvastatin 10 mg for 2 years and her LDL did come down from 154 back in 2018 to 91, her HDL is 67, and her total cholesterol came down from 248-186.  She was wondering now if her atorvastatin could be contributing to her back pain and hip pain; she is going for an MRI of her hip, and Dr. Montelongo just started her on Cymbalta 20 mg for her neuropathy pain.  He also decided she needs a work-up for polycythemia vera; she has had hemoglobins around 16-17 and hematocrit in the 50s at least going back to 2018.  She will be seeing Dr. Breanne Cary.  She does have symptoms of palpitations with an uncomfortable feeling in her chest but it sounds like an extra beat that she is feeling, it lasts under a minute and it has been happening for the last 2 weeks but she has been having stress.  Recent ECG with Dr. Montelongo did have APCs which she thinks was done while she was having that symptom.  She also has very minimal ST depressions that she has had in the past although maybe it is a little more noticeable on this ECG but still not enough to call ischemia.  No interval hospitalizations or surgeries.  Did have a mild case of COVID at 1 point.  No syncope near syncope etc.  No exertional chest pain but she is limited in her exercise.\par May 17, 2023. Patient returns for echocardiogram. Normal echo other than very minimal S.A.M., possibly chordal versus valvular with no significant LVOT gradient. LV and RV function. Mildly frequent APCs during the exam. Mild findings of early diastolic dysfunction. \par Maira 15, 2023. Patient return for stress echo. Exercised only for minutes to a heart rate of 153 and a blood pressure of 166/86. She actually reached 150 at the end of stage I. No chest pain but did have some shortness of breath. No ST changes. No significant arrhythmias. No echocardiographic evidence of ischemia. \par \par

## 2023-06-16 NOTE — REVIEW OF SYSTEMS
[Chest Discomfort] : chest discomfort [Palpitations] : palpitations [FreeTextEntry5] : see HPI [FreeTextEntry9] : see HPI

## 2023-06-16 NOTE — REASON FOR VISIT
[FreeTextEntry1] : 72-year-old woman with hyperlipidemia, positive family history of CAD, and palpitations

## 2023-06-19 LAB — METHYLMALONATE SERPL-SCNC: 304 NMOL/L

## 2023-06-20 ENCOUNTER — APPOINTMENT (OUTPATIENT)
Dept: INTERNAL MEDICINE | Facility: CLINIC | Age: 73
End: 2023-06-20
Payer: MEDICARE

## 2023-06-20 VITALS — HEIGHT: 64 IN | WEIGHT: 177 LBS | BODY MASS INDEX: 30.22 KG/M2

## 2023-06-20 VITALS — DIASTOLIC BLOOD PRESSURE: 78 MMHG | SYSTOLIC BLOOD PRESSURE: 120 MMHG

## 2023-06-20 PROCEDURE — 99214 OFFICE O/P EST MOD 30 MIN: CPT

## 2023-06-20 NOTE — ASSESSMENT
[FreeTextEntry1] : Problems\par Polycythemia-the patient recently saw her hematologist and had an evaluation the JAK2 mutation was negative.  And the feeling was that her polycythemia was secondary to volume depletion or possibly to sleep apnea but the patient has no symptoms of sleep apnea I advised her to hydrate and we will repeat a CBC in 3 months\par Hypertension-her blood pressure is presently very well controlled.  She had a cardiac cardiology evaluation with a normal echo and normal echo stress test.\par Left hip pain-she was seen by orthopedics and in view of the patient's increasing symptoms with difficulty walking the patient and the orthopedist have decided to do a left hip replacement which will take place sometime in October.\par

## 2023-06-20 NOTE — HISTORY OF PRESENT ILLNESS
[de-identified] : This is a 72-year-old patient who is here for consultation regarding her left hip.

## 2023-06-21 LAB — JAK2 GENE MUT ANL BLD/T: NORMAL

## 2023-06-27 ENCOUNTER — RX RENEWAL (OUTPATIENT)
Age: 73
End: 2023-06-27

## 2023-07-25 ENCOUNTER — APPOINTMENT (OUTPATIENT)
Dept: ORTHOPEDIC SURGERY | Facility: CLINIC | Age: 73
End: 2023-07-25
Payer: MEDICARE

## 2023-07-25 DIAGNOSIS — M51.37 OTHER INTERVERTEBRAL DISC DEGENERATION, LUMBOSACRAL REGION: ICD-10-CM

## 2023-07-25 DIAGNOSIS — M16.12 UNILATERAL PRIMARY OSTEOARTHRITIS, LEFT HIP: ICD-10-CM

## 2023-07-25 PROCEDURE — 99214 OFFICE O/P EST MOD 30 MIN: CPT

## 2023-07-25 NOTE — PHYSICAL EXAM
[de-identified] : This patient is having constant discomfort and pain in her left hip and has a long history of intermittent pain in her back.\par \par Spine - deep tendon reflexes are symmetric. Motor and sensory are symmetric.  She does have pain across her low back as well as pain radiating into her left buttocks.  She is followed by her pain doctors and at this time is had injections for her pain and with epidurals.  She realizes her back is a separate issue and I suggest she be seen by spine orthopedics for their evaluation.\par \par \par Circulation appears satisfactory with pedal pulses present.  There is no major edema in the lower legs. No skin tenderness or increased temperature. No major varicosities.\par \par HIP EXAMINATION the abduction and abduction as well as rotation measurements were taken with the hip in flexion.\par \par Motion\par She does have pain any ambulation now in her left hip and decreased motion of the left as compared to the right . she has flexion right hip 140 left hip 120, abduction right hip 85 left hip 50, adduction right hip 45 left hip 20, external rotation right hip 80 left hip 40 internal rotation right hip 35 left hip 10.  He has pain with range of motion of her left hip and this is in the groin.  When lifting the left hip and the straight leg raise she gets back pain.\par \par Guards the strength in her left hip.  The alignment of her hips is normal.\par \par \par KNEE EXAMINATION\par \par Motion\par Right and left knees have full motion bilaterally no pain no laxity and normal alignment.   \par \par \par \par \par \par  [de-identified] : EXAM: [id number] - MR HIP LT - ORDERED BY: ARISTIDES JUAREZ\par \par PROCEDURE DATE: 05/15/2023\par \par INTERPRETATION: MR HIP LEFT\par \par HISTORY: Left hip pain. Osteoarthritis. Pain for 2 years. No injury.\par \par TECHNIQUE: Multiplanar MRI of the left hip was performed without contrast.\par \par COMPARISON: CT abdomen and pelvis dated 4/18/2018.\par \par FINDINGS:\par \par OSSEOUS STRUCTURES\par  Fractures/Stress Reactions: None.\par \par HIP JOINT\par  Morphology: Normal.\par  Avascular Necrosis: None.\par  Articular Cartilage: Severe cartilage loss is present anterosuperiorly with anterior decentering of the femoral head, subchondral reactive changes, and moderate-sized osteophytes.\par  Acetabular Labrum: Anterior and superior labrum are degenerated and torn.\par  Effusion/Synovitis: Moderate to severe synovial thickening appears to be present.\par \par TENDONS\par  Gluteus Minimus Tendon: Intact.\par  Gluteus Medius Tendon: Intact.\par  Iliopsoas Tendon: Intact.\par  Common Hamstring Tendon: There is slight intrasubstance tearing of the hamstring origin.\par  Bursa: Trace fluid is noted within the iliopsoas bursa.\par \par SOFT TISSUE\par  Pelvis: Colonic diverticuli are present without appreciated inflammatory change.\par  Musculature: Unremarkable.\par  Subcutaneous Tissues: Unremarkable.\par \par NEUROVASCULAR STRUCTURES\par  Sciatic nerve: Preserved.\par \par IMPRESSION:\par 1. Severe osteoarthritis is present with moderate to severe synovitis.\par \par CHIVO LUCIA MD; Attending Radiologist\par This document has been electronically signed. May 18 2023 9:08PM\par \par \par An AP of the pelvis and lateral of the right left hip were done the right femoral head is round joint space maintained there is no evidence of osteoarthritis by x-ray her left hip does show marked decreased joint space superiorly probable bone against bone against the superior aspect of femoral head and acetabulum with increased subchondral sclerosis of the acetabulum all consistent with significant degeneration of the left hip with arthritis.\par \par An AP and lateral of the lumbar spine shows patient with a curvature convexity to the right in the lumbar area on the AP view the lateral view shows significant degenerative disks in the lower lumbar area especially L3-4 L4-5 and L5-S1.  There may well be significant degenerative changes in the lumbar facets.

## 2023-07-25 NOTE — HISTORY OF PRESENT ILLNESS
[de-identified] : Ms. CHIARA YING is a 72 year female who returns to office complaining of left hip pain chronic in nature.\par Denies specific injury, trauma, or fall.\par Pain is described as a constant dull/achy pain localized to her anterior hip/groin and also present in her left sided lower back.\par Pain is aggravated with getting up from seated positions and standing/walking for long periods of time.\par She sees a pain management doctor for her back pain, periodically managed with epidural injections.\par She also takes Advil PRN, which does not seem to help.\par Denies associated numbness, tingling, or weakness of her lower extremities.

## 2023-07-25 NOTE — DISCUSSION/SUMMARY
[de-identified] : I feel at this time the patient should be seen by one of the orthopedic spine doctors for better evaluation of her back.  As far as her left hip she has severe arthritis and would greatly benefit from total hip replacement.  A long discussion was had with the patient about surgery as well as the perioperative.  As well as the rehabilitation.  The risk and benefits were discussed.  The natural history and treatment of degenerative arthritis was discussed with the patient at length today.  The spectrum of the treatment including nonoperative modalities to surgical intervention was elucidated.  Noninvasive and nonoperative treatment modalities include weight reduction, activity modification with low impact exercise, needed use of Tylenol or anti-inflammatory medications if tolerated, glucosamine and chondroitin supplement, and physical therapy.  In some cases the further treatment can include corticosteroid injections and the use of Visco supplementation with hyaluronic acid injections.  Definite surgical treatment can certainly include total joint arthroplasty also.  The risk and benefits of each treatment option was discussed and questions were answered.\par \par  A  long discussion was had with the patient as what the total joint replacement would entail.  A model was used as an educational tool to demonstrate the operation.    We did discuss implant choice and fixation, cemented or porous ingrowth, and stability concerns.  Shared decision making was made with the patient. The hospitalization and rehabilitation were discussed.  The uses of perioperative antibiotics and DVT prophylaxis were discussed.   The risks, benefits and alternatives to surgical intervention were discussed at length with the patient. Specific risks discussed included: infection, wound breakdown, numbness and damage to nerves, tendon, muscle, arteries or other blood vessels.  The possibility of recurrent pain, no improvement in pain and actual worsening of the pain were also mentioned in conversation with the patient. Medical complications related to the patient's general medical health including deep vein thrombosis, pulmonary embolus, heart attack, stroke, death and other complications from anesthesia were addressed. The patient was told that we will take steps to minimize these risks by using sterile technique, antibiotics and DVT prophylaxis when appropriate and following the patient postoperatively. The benefits of surgery were discussed with the patient including the potential to improve the current clinical condition throughout operative intervention. Alternatives to surgical intervention include continued conservative management which may yield less than optimal results this particular patient. Questions were answered to the satisfaction of the patient.\par \par In this individual the additional risk factors due to their medical conditions were discussed.\par \par The patient is going to be taking a trip over the next month but she would like to schedule a total hip replacement when she returns in the fall.

## 2023-07-27 ENCOUNTER — APPOINTMENT (OUTPATIENT)
Dept: ORTHOPEDIC SURGERY | Facility: CLINIC | Age: 73
End: 2023-07-27
Payer: MEDICARE

## 2023-07-27 VITALS — BODY MASS INDEX: 29.16 KG/M2 | HEIGHT: 65 IN | WEIGHT: 175 LBS

## 2023-07-27 DIAGNOSIS — M51.36 OTHER INTERVERTEBRAL DISC DEGENERATION, LUMBAR REGION: ICD-10-CM

## 2023-07-27 PROCEDURE — 72100 X-RAY EXAM L-S SPINE 2/3 VWS: CPT

## 2023-07-27 PROCEDURE — 99214 OFFICE O/P EST MOD 30 MIN: CPT

## 2023-08-07 RX ORDER — DICLOFENAC SODIUM 75 MG/1
75 TABLET, DELAYED RELEASE ORAL TWICE DAILY
Qty: 20 | Refills: 1 | Status: ACTIVE | COMMUNITY
Start: 2023-07-25 | End: 1900-01-01

## 2023-09-06 ENCOUNTER — RX RENEWAL (OUTPATIENT)
Age: 73
End: 2023-09-06

## 2023-09-06 RX ORDER — DULOXETINE HYDROCHLORIDE 30 MG/1
30 CAPSULE, DELAYED RELEASE PELLETS ORAL
Qty: 30 | Refills: 0 | Status: ACTIVE | COMMUNITY
Start: 2023-05-09 | End: 1900-01-01

## 2023-09-19 NOTE — REVIEW OF SYSTEMS
Pt arrives to ER with complaints of inability to urinate, constipation (x 3days), unable to pass gas, frequent burping, and vomiting for x2 days.   Denies fevers.     Perico [id number] ([place] )  
[Negative] : Psychiatric

## 2023-09-26 ENCOUNTER — NON-APPOINTMENT (OUTPATIENT)
Age: 73
End: 2023-09-26

## 2023-09-28 ENCOUNTER — APPOINTMENT (OUTPATIENT)
Dept: INTERNAL MEDICINE | Facility: CLINIC | Age: 73
End: 2023-09-28
Payer: MEDICARE

## 2023-09-28 VITALS — DIASTOLIC BLOOD PRESSURE: 70 MMHG | SYSTOLIC BLOOD PRESSURE: 120 MMHG

## 2023-09-28 VITALS — WEIGHT: 172 LBS | BODY MASS INDEX: 28.66 KG/M2 | TEMPERATURE: 97.9 F | HEIGHT: 65 IN

## 2023-09-28 DIAGNOSIS — R05.3 CHRONIC COUGH: ICD-10-CM

## 2023-09-28 DIAGNOSIS — E78.00 PURE HYPERCHOLESTEROLEMIA, UNSPECIFIED: ICD-10-CM

## 2023-09-28 PROCEDURE — 99214 OFFICE O/P EST MOD 30 MIN: CPT

## 2023-09-28 RX ORDER — BENZONATATE 100 MG/1
100 CAPSULE ORAL 3 TIMES DAILY
Qty: 30 | Refills: 1 | Status: ACTIVE | COMMUNITY
Start: 2023-09-28 | End: 1900-01-01

## 2023-09-29 LAB
HPIV1 RNA SPEC QL NAA+PROBE: DETECTED
RAPID RVP RESULT: DETECTED
SARS-COV-2 RNA PNL RESP NAA+PROBE: NOT DETECTED

## 2023-10-02 ENCOUNTER — OUTPATIENT (OUTPATIENT)
Dept: OUTPATIENT SERVICES | Facility: HOSPITAL | Age: 73
LOS: 1 days | End: 2023-10-02
Payer: MEDICARE

## 2023-10-02 ENCOUNTER — APPOINTMENT (OUTPATIENT)
Dept: RADIOLOGY | Facility: CLINIC | Age: 73
End: 2023-10-02
Payer: MEDICARE

## 2023-10-02 DIAGNOSIS — R05.3 CHRONIC COUGH: ICD-10-CM

## 2023-10-02 PROCEDURE — 71046 X-RAY EXAM CHEST 2 VIEWS: CPT | Mod: 26

## 2023-10-02 PROCEDURE — 71046 X-RAY EXAM CHEST 2 VIEWS: CPT

## 2023-10-24 ENCOUNTER — RX RENEWAL (OUTPATIENT)
Age: 73
End: 2023-10-24

## 2023-10-30 ENCOUNTER — OUTPATIENT (OUTPATIENT)
Dept: OUTPATIENT SERVICES | Facility: HOSPITAL | Age: 73
LOS: 1 days | End: 2023-10-30
Payer: MEDICARE

## 2023-10-30 VITALS
OXYGEN SATURATION: 99 % | HEIGHT: 64 IN | WEIGHT: 173.94 LBS | TEMPERATURE: 99 F | HEART RATE: 88 BPM | DIASTOLIC BLOOD PRESSURE: 85 MMHG | RESPIRATION RATE: 16 BRPM | SYSTOLIC BLOOD PRESSURE: 138 MMHG

## 2023-10-30 DIAGNOSIS — Z96.22 MYRINGOTOMY TUBE(S) STATUS: Chronic | ICD-10-CM

## 2023-10-30 DIAGNOSIS — I10 ESSENTIAL (PRIMARY) HYPERTENSION: ICD-10-CM

## 2023-10-30 DIAGNOSIS — Z01.818 ENCOUNTER FOR OTHER PREPROCEDURAL EXAMINATION: ICD-10-CM

## 2023-10-30 DIAGNOSIS — I73.00 RAYNAUD'S SYNDROME WITHOUT GANGRENE: ICD-10-CM

## 2023-10-30 DIAGNOSIS — M16.12 UNILATERAL PRIMARY OSTEOARTHRITIS, LEFT HIP: ICD-10-CM

## 2023-10-30 DIAGNOSIS — M19.90 UNSPECIFIED OSTEOARTHRITIS, UNSPECIFIED SITE: ICD-10-CM

## 2023-10-30 DIAGNOSIS — Z29.9 ENCOUNTER FOR PROPHYLACTIC MEASURES, UNSPECIFIED: ICD-10-CM

## 2023-10-30 LAB
A1C WITH ESTIMATED AVERAGE GLUCOSE RESULT: 5.5 % — SIGNIFICANT CHANGE UP (ref 4–5.6)
A1C WITH ESTIMATED AVERAGE GLUCOSE RESULT: 5.5 % — SIGNIFICANT CHANGE UP (ref 4–5.6)
ANION GAP SERPL CALC-SCNC: 13 MMOL/L — SIGNIFICANT CHANGE UP (ref 5–17)
ANION GAP SERPL CALC-SCNC: 13 MMOL/L — SIGNIFICANT CHANGE UP (ref 5–17)
BLD GP AB SCN SERPL QL: NEGATIVE — SIGNIFICANT CHANGE UP
BLD GP AB SCN SERPL QL: NEGATIVE — SIGNIFICANT CHANGE UP
BUN SERPL-MCNC: 18 MG/DL — SIGNIFICANT CHANGE UP (ref 7–23)
BUN SERPL-MCNC: 18 MG/DL — SIGNIFICANT CHANGE UP (ref 7–23)
CALCIUM SERPL-MCNC: 9.7 MG/DL — SIGNIFICANT CHANGE UP (ref 8.4–10.5)
CALCIUM SERPL-MCNC: 9.7 MG/DL — SIGNIFICANT CHANGE UP (ref 8.4–10.5)
CHLORIDE SERPL-SCNC: 103 MMOL/L — SIGNIFICANT CHANGE UP (ref 96–108)
CHLORIDE SERPL-SCNC: 103 MMOL/L — SIGNIFICANT CHANGE UP (ref 96–108)
CO2 SERPL-SCNC: 24 MMOL/L — SIGNIFICANT CHANGE UP (ref 22–31)
CO2 SERPL-SCNC: 24 MMOL/L — SIGNIFICANT CHANGE UP (ref 22–31)
CREAT SERPL-MCNC: 0.73 MG/DL — SIGNIFICANT CHANGE UP (ref 0.5–1.3)
CREAT SERPL-MCNC: 0.73 MG/DL — SIGNIFICANT CHANGE UP (ref 0.5–1.3)
EGFR: 88 ML/MIN/1.73M2 — SIGNIFICANT CHANGE UP
EGFR: 88 ML/MIN/1.73M2 — SIGNIFICANT CHANGE UP
ESTIMATED AVERAGE GLUCOSE: 111 MG/DL — SIGNIFICANT CHANGE UP (ref 68–114)
ESTIMATED AVERAGE GLUCOSE: 111 MG/DL — SIGNIFICANT CHANGE UP (ref 68–114)
GLUCOSE SERPL-MCNC: 122 MG/DL — HIGH (ref 70–99)
GLUCOSE SERPL-MCNC: 122 MG/DL — HIGH (ref 70–99)
HCT VFR BLD CALC: 48.7 % — HIGH (ref 34.5–45)
HCT VFR BLD CALC: 48.7 % — HIGH (ref 34.5–45)
HGB BLD-MCNC: 15.8 G/DL — HIGH (ref 11.5–15.5)
HGB BLD-MCNC: 15.8 G/DL — HIGH (ref 11.5–15.5)
MCHC RBC-ENTMCNC: 29.8 PG — SIGNIFICANT CHANGE UP (ref 27–34)
MCHC RBC-ENTMCNC: 29.8 PG — SIGNIFICANT CHANGE UP (ref 27–34)
MCHC RBC-ENTMCNC: 32.4 GM/DL — SIGNIFICANT CHANGE UP (ref 32–36)
MCHC RBC-ENTMCNC: 32.4 GM/DL — SIGNIFICANT CHANGE UP (ref 32–36)
MCV RBC AUTO: 91.9 FL — SIGNIFICANT CHANGE UP (ref 80–100)
MCV RBC AUTO: 91.9 FL — SIGNIFICANT CHANGE UP (ref 80–100)
MRSA PCR RESULT.: SIGNIFICANT CHANGE UP
MRSA PCR RESULT.: SIGNIFICANT CHANGE UP
NRBC # BLD: 0 /100 WBCS — SIGNIFICANT CHANGE UP (ref 0–0)
NRBC # BLD: 0 /100 WBCS — SIGNIFICANT CHANGE UP (ref 0–0)
PLATELET # BLD AUTO: 243 K/UL — SIGNIFICANT CHANGE UP (ref 150–400)
PLATELET # BLD AUTO: 243 K/UL — SIGNIFICANT CHANGE UP (ref 150–400)
POTASSIUM SERPL-MCNC: 4.1 MMOL/L — SIGNIFICANT CHANGE UP (ref 3.5–5.3)
POTASSIUM SERPL-MCNC: 4.1 MMOL/L — SIGNIFICANT CHANGE UP (ref 3.5–5.3)
POTASSIUM SERPL-SCNC: 4.1 MMOL/L — SIGNIFICANT CHANGE UP (ref 3.5–5.3)
POTASSIUM SERPL-SCNC: 4.1 MMOL/L — SIGNIFICANT CHANGE UP (ref 3.5–5.3)
RBC # BLD: 5.3 M/UL — HIGH (ref 3.8–5.2)
RBC # BLD: 5.3 M/UL — HIGH (ref 3.8–5.2)
RBC # FLD: 14 % — SIGNIFICANT CHANGE UP (ref 10.3–14.5)
RBC # FLD: 14 % — SIGNIFICANT CHANGE UP (ref 10.3–14.5)
RH IG SCN BLD-IMP: POSITIVE — SIGNIFICANT CHANGE UP
RH IG SCN BLD-IMP: POSITIVE — SIGNIFICANT CHANGE UP
S AUREUS DNA NOSE QL NAA+PROBE: SIGNIFICANT CHANGE UP
S AUREUS DNA NOSE QL NAA+PROBE: SIGNIFICANT CHANGE UP
SODIUM SERPL-SCNC: 140 MMOL/L — SIGNIFICANT CHANGE UP (ref 135–145)
SODIUM SERPL-SCNC: 140 MMOL/L — SIGNIFICANT CHANGE UP (ref 135–145)
WBC # BLD: 6.95 K/UL — SIGNIFICANT CHANGE UP (ref 3.8–10.5)
WBC # BLD: 6.95 K/UL — SIGNIFICANT CHANGE UP (ref 3.8–10.5)
WBC # FLD AUTO: 6.95 K/UL — SIGNIFICANT CHANGE UP (ref 3.8–10.5)
WBC # FLD AUTO: 6.95 K/UL — SIGNIFICANT CHANGE UP (ref 3.8–10.5)

## 2023-10-30 PROCEDURE — 85027 COMPLETE CBC AUTOMATED: CPT

## 2023-10-30 PROCEDURE — 86850 RBC ANTIBODY SCREEN: CPT

## 2023-10-30 PROCEDURE — 80048 BASIC METABOLIC PNL TOTAL CA: CPT

## 2023-10-30 PROCEDURE — 83036 HEMOGLOBIN GLYCOSYLATED A1C: CPT

## 2023-10-30 PROCEDURE — 87641 MR-STAPH DNA AMP PROBE: CPT

## 2023-10-30 PROCEDURE — 86900 BLOOD TYPING SEROLOGIC ABO: CPT

## 2023-10-30 PROCEDURE — 87640 STAPH A DNA AMP PROBE: CPT

## 2023-10-30 PROCEDURE — 86901 BLOOD TYPING SEROLOGIC RH(D): CPT

## 2023-10-30 PROCEDURE — G0463: CPT

## 2023-10-30 RX ORDER — CEFAZOLIN SODIUM 1 G
2000 VIAL (EA) INJECTION ONCE
Refills: 0 | Status: COMPLETED | OUTPATIENT
Start: 2023-11-16 | End: 2023-11-16

## 2023-10-30 RX ORDER — CHLORHEXIDINE GLUCONATE 213 G/1000ML
1 SOLUTION TOPICAL ONCE
Refills: 0 | Status: COMPLETED | OUTPATIENT
Start: 2023-11-16 | End: 2023-11-16

## 2023-10-30 RX ORDER — LIDOCAINE HCL 20 MG/ML
0.2 VIAL (ML) INJECTION ONCE
Refills: 0 | Status: COMPLETED | OUTPATIENT
Start: 2023-11-16 | End: 2023-11-16

## 2023-10-30 RX ORDER — TRAMADOL HYDROCHLORIDE 50 MG/1
50 TABLET ORAL ONCE
Refills: 0 | Status: DISCONTINUED | OUTPATIENT
Start: 2023-11-16 | End: 2023-11-16

## 2023-10-30 RX ORDER — SODIUM CHLORIDE 9 MG/ML
3 INJECTION INTRAMUSCULAR; INTRAVENOUS; SUBCUTANEOUS EVERY 8 HOURS
Refills: 0 | Status: DISCONTINUED | OUTPATIENT
Start: 2023-11-16 | End: 2023-11-16

## 2023-10-30 RX ORDER — PANTOPRAZOLE SODIUM 20 MG/1
40 TABLET, DELAYED RELEASE ORAL ONCE
Refills: 0 | Status: COMPLETED | OUTPATIENT
Start: 2023-11-16 | End: 2023-11-16

## 2023-10-30 NOTE — H&P PST ADULT - NSANTHOSAYNRD_GEN_A_CORE
No. FIDENCIO screening performed.  STOP BANG Legend: 0-2 = LOW Risk; 3-4 = INTERMEDIATE Risk; 5-8 = HIGH Risk

## 2023-10-30 NOTE — H&P PST ADULT - ASSESSMENT
Permanent bridge, s/p dental evaluation because she thought it was loose   Dr. Wei Elian dentist Activity Level  DASI 5.07 activity limited due to hip pain    Airway  Mallampati III  Permanent bridge, s/p dental evaluation because she thought it was loose   Dr. Eriberto Plummer dentist reports bridge is secure    SWETAI VTE 2.0 SCORE [CLOT updated 2019]    AGE RELATED RISK FACTORS                                                       MOBILITY RELATED FACTORS  [ ] Age 41-60 years                                            (1 Point)                    [ ] Bed rest                                                        (1 Point)  [X ] Age: 61-74 years                                           (2 Points)                  [ ] Plaster cast                                                   (2 Points)  [ ] Age= 75 years                                              (3 Points)                    [ ] Bed bound for more than 72 hours                 (2 Points)    DISEASE RELATED RISK FACTORS                                               GENDER SPECIFIC FACTORS  [ ] Edema in the lower extremities                       (1 Point)              [ ] Pregnancy                                                     (1 Point)  [ ] Varicose veins                                               (1 Point)                     [ ] Post-partum < 6 weeks                                   (1 Point)             [X ] BMI > 25 Kg/m2                                            (1 Point)                     [ ] Hormonal therapy  or oral contraception          (1 Point)                 [ ] Sepsis (in the previous month)                        (1 Point)               [ ] History of pregnancy complications                 (1 point)  [ ] Pneumonia or serious lung disease                                               [ ] Unexplained or recurrent                     (1 Point)           (in the previous month)                               (1 Point)  [ ] Abnormal pulmonary function test                     (1 Point)                 SURGERY RELATED RISK FACTORS  [ ] Acute myocardial infarction                              (1 Point)               [ ]  Section                                             (1 Point)  [ ] Congestive heart failure (in the previous month)  (1 Point)      [ ] Minor surgery                                                  (1 Point)   [ ] Inflammatory bowel disease                             (1 Point)               [ ] Arthroscopic surgery                                        (2 Points)  [ ] Central venous access                                      (2 Points)                [ ] General surgery lasting more than 45 minutes (2 points)  [ ] Malignancy- Present or previous                   (2 Points)                [X ] Elective arthroplasty                                         (5 points)    [ ] Stroke (in the previous month)                          (5 Points)                                                                                                                                                           HEMATOLOGY RELATED FACTORS                                                 TRAUMA RELATED RISK FACTORS  [ ] Prior episodes of VTE                                     (3 Points)                [ ] Fracture of the hip, pelvis, or leg                       (5 Points)  [ ] Positive family history for VTE                         (3 Points)             [ ] Acute spinal cord injury (in the previous month)  (5 Points)  [ ] Prothrombin 40869 A                                     (3 Points)               [ ] Paralysis  (less than 1 month)                             (5 Points)  [ ] Factor V Leiden                                             (3 Points)                  [ ] Multiple Trauma within 1 month                        (5 Points)  [ ] Lupus anticoagulants                                     (3 Points)                                                           [ ] Anticardiolipin antibodies                               (3 Points)                                                       [ ] High homocysteine in the blood                      (3 Points)                                             [ ] Other congenital or acquired thrombophilia      (3 Points)                                                [ ] Heparin induced thrombocytopenia                  (3 Points)                                     Total Score [      8    ]

## 2023-10-30 NOTE — H&P PST ADULT - HISTORY OF PRESENT ILLNESS
72 year old female presents for left total hip replacement posterior approach. Pt. reports experiencing chronic left hip and lower back pain.    Denies history of COVID infection.

## 2023-10-30 NOTE — H&P PST ADULT - REASON FOR ADMISSION
"I am having my left hip replaced"  Goals of care "I want to be able to walk again and be pain free"

## 2023-10-30 NOTE — H&P PST ADULT - NSICDXPASTMEDICALHX_GEN_ALL_CORE_FT
PAST MEDICAL HISTORY:  Deviated septum     H/O sciatica     Torres Martinez (hard of hearing)     HTN (hypertension)     Hyperlipidemia     Lumbar degenerative disc disease     OA (osteoarthritis)     Raynauds disease

## 2023-10-31 ENCOUNTER — RX RENEWAL (OUTPATIENT)
Age: 73
End: 2023-10-31

## 2023-11-06 ENCOUNTER — OUTPATIENT (OUTPATIENT)
Dept: OUTPATIENT SERVICES | Facility: HOSPITAL | Age: 73
LOS: 1 days | Discharge: HOME | End: 2023-11-06

## 2023-11-06 DIAGNOSIS — Z96.22 MYRINGOTOMY TUBE(S) STATUS: Chronic | ICD-10-CM

## 2023-11-06 DIAGNOSIS — D58.2 OTHER HEMOGLOBINOPATHIES: ICD-10-CM

## 2023-11-07 PROBLEM — M51.36 OTHER INTERVERTEBRAL DISC DEGENERATION, LUMBAR REGION: Chronic | Status: ACTIVE | Noted: 2023-10-30

## 2023-11-07 PROBLEM — E78.5 HYPERLIPIDEMIA, UNSPECIFIED: Chronic | Status: ACTIVE | Noted: 2023-10-30

## 2023-11-07 PROBLEM — Z86.69 PERSONAL HISTORY OF OTHER DISEASES OF THE NERVOUS SYSTEM AND SENSE ORGANS: Chronic | Status: ACTIVE | Noted: 2023-10-30

## 2023-11-07 PROBLEM — J34.2 DEVIATED NASAL SEPTUM: Chronic | Status: ACTIVE | Noted: 2023-10-30

## 2023-11-07 PROBLEM — M19.90 UNSPECIFIED OSTEOARTHRITIS, UNSPECIFIED SITE: Chronic | Status: ACTIVE | Noted: 2023-10-30

## 2023-11-07 PROBLEM — I73.00 RAYNAUD'S SYNDROME WITHOUT GANGRENE: Chronic | Status: ACTIVE | Noted: 2023-10-30

## 2023-11-07 PROBLEM — I10 ESSENTIAL (PRIMARY) HYPERTENSION: Chronic | Status: ACTIVE | Noted: 2023-10-30

## 2023-11-07 PROBLEM — H91.90 UNSPECIFIED HEARING LOSS, UNSPECIFIED EAR: Chronic | Status: ACTIVE | Noted: 2023-10-30

## 2023-11-09 ENCOUNTER — APPOINTMENT (OUTPATIENT)
Dept: INTERNAL MEDICINE | Facility: CLINIC | Age: 73
End: 2023-11-09
Payer: MEDICARE

## 2023-11-09 ENCOUNTER — NON-APPOINTMENT (OUTPATIENT)
Age: 73
End: 2023-11-09

## 2023-11-09 VITALS — BODY MASS INDEX: 29.32 KG/M2 | HEIGHT: 65 IN | WEIGHT: 176 LBS

## 2023-11-09 VITALS — DIASTOLIC BLOOD PRESSURE: 70 MMHG | SYSTOLIC BLOOD PRESSURE: 120 MMHG

## 2023-11-09 DIAGNOSIS — D75.1 SECONDARY POLYCYTHEMIA: ICD-10-CM

## 2023-11-09 DIAGNOSIS — M16.9 OSTEOARTHRITIS OF HIP, UNSPECIFIED: ICD-10-CM

## 2023-11-09 DIAGNOSIS — I10 ESSENTIAL (PRIMARY) HYPERTENSION: ICD-10-CM

## 2023-11-09 PROCEDURE — 93000 ELECTROCARDIOGRAM COMPLETE: CPT

## 2023-11-09 PROCEDURE — 99214 OFFICE O/P EST MOD 30 MIN: CPT | Mod: 25

## 2023-11-10 LAB
APPEARANCE: CLEAR
BACTERIA: NEGATIVE /HPF
BILIRUBIN URINE: NEGATIVE
BLOOD URINE: NEGATIVE
CAST: 0 /LPF
COLOR: YELLOW
EPITHELIAL CELLS: 2 /HPF
GLUCOSE QUALITATIVE U: NEGATIVE MG/DL
KETONES URINE: NEGATIVE MG/DL
LEUKOCYTE ESTERASE URINE: ABNORMAL
MICROSCOPIC-UA: NORMAL
NITRITE URINE: NEGATIVE
PH URINE: 5.5
PROTEIN URINE: NEGATIVE MG/DL
RED BLOOD CELLS URINE: 0 /HPF
SPECIFIC GRAVITY URINE: 1.01
UROBILINOGEN URINE: 0.2 MG/DL
WHITE BLOOD CELLS URINE: 4 /HPF

## 2023-11-11 LAB — BACTERIA UR CULT: NORMAL

## 2023-11-15 ENCOUNTER — TRANSCRIPTION ENCOUNTER (OUTPATIENT)
Age: 73
End: 2023-11-15

## 2023-11-16 ENCOUNTER — APPOINTMENT (OUTPATIENT)
Dept: ORTHOPEDIC SURGERY | Facility: HOSPITAL | Age: 73
End: 2023-11-16

## 2023-11-16 ENCOUNTER — APPOINTMENT (OUTPATIENT)
Dept: HEMATOLOGY ONCOLOGY | Facility: CLINIC | Age: 73
End: 2023-11-16

## 2023-11-16 ENCOUNTER — TRANSCRIPTION ENCOUNTER (OUTPATIENT)
Age: 73
End: 2023-11-16

## 2023-11-16 ENCOUNTER — OUTPATIENT (OUTPATIENT)
Dept: INPATIENT UNIT | Facility: HOSPITAL | Age: 73
LOS: 1 days | End: 2023-11-16
Payer: MEDICARE

## 2023-11-16 VITALS
HEART RATE: 79 BPM | HEIGHT: 64.02 IN | OXYGEN SATURATION: 98 % | WEIGHT: 173.94 LBS | SYSTOLIC BLOOD PRESSURE: 150 MMHG | DIASTOLIC BLOOD PRESSURE: 81 MMHG | TEMPERATURE: 98 F | RESPIRATION RATE: 18 BRPM

## 2023-11-16 DIAGNOSIS — M16.12 UNILATERAL PRIMARY OSTEOARTHRITIS, LEFT HIP: ICD-10-CM

## 2023-11-16 DIAGNOSIS — Z96.22 MYRINGOTOMY TUBE(S) STATUS: Chronic | ICD-10-CM

## 2023-11-16 LAB
GLUCOSE BLDC GLUCOMTR-MCNC: 78 MG/DL — SIGNIFICANT CHANGE UP (ref 70–99)
GLUCOSE BLDC GLUCOMTR-MCNC: 78 MG/DL — SIGNIFICANT CHANGE UP (ref 70–99)

## 2023-11-16 DEVICE — SCREW 6.5MM 30MM: Type: IMPLANTABLE DEVICE | Site: LEFT | Status: FUNCTIONAL

## 2023-11-16 DEVICE — IMPLANTABLE DEVICE: Type: IMPLANTABLE DEVICE | Site: LEFT | Status: FUNCTIONAL

## 2023-11-16 DEVICE — HEAD FEM CERAMIC DELTA 36MM -4.0 OFFSET: Type: IMPLANTABLE DEVICE | Site: LEFT | Status: FUNCTIONAL

## 2023-11-16 DEVICE — SHELL ACET FMP P2 HEMISPHR 52MM: Type: IMPLANTABLE DEVICE | Site: LEFT | Status: FUNCTIONAL

## 2023-11-16 RX ORDER — CEFAZOLIN SODIUM 1 G
2000 VIAL (EA) INJECTION EVERY 8 HOURS
Refills: 0 | Status: COMPLETED | OUTPATIENT
Start: 2023-11-16 | End: 2023-11-17

## 2023-11-16 RX ORDER — HYDROMORPHONE HYDROCHLORIDE 2 MG/ML
0.5 INJECTION INTRAMUSCULAR; INTRAVENOUS; SUBCUTANEOUS ONCE
Refills: 0 | Status: DISCONTINUED | OUTPATIENT
Start: 2023-11-16 | End: 2023-11-17

## 2023-11-16 RX ORDER — AMLODIPINE BESYLATE 2.5 MG/1
1 TABLET ORAL
Refills: 0 | DISCHARGE

## 2023-11-16 RX ORDER — AMLODIPINE BESYLATE 2.5 MG/1
2.5 TABLET ORAL DAILY
Refills: 0 | Status: DISCONTINUED | OUTPATIENT
Start: 2023-11-16 | End: 2023-11-17

## 2023-11-16 RX ORDER — TRAMADOL HYDROCHLORIDE 50 MG/1
50 TABLET ORAL EVERY 6 HOURS
Refills: 0 | Status: DISCONTINUED | OUTPATIENT
Start: 2023-11-16 | End: 2023-11-17

## 2023-11-16 RX ORDER — POLYETHYLENE GLYCOL 3350 17 G/17G
17 POWDER, FOR SOLUTION ORAL AT BEDTIME
Refills: 0 | Status: DISCONTINUED | OUTPATIENT
Start: 2023-11-16 | End: 2023-11-17

## 2023-11-16 RX ORDER — SODIUM CHLORIDE 9 MG/ML
500 INJECTION INTRAMUSCULAR; INTRAVENOUS; SUBCUTANEOUS ONCE
Refills: 0 | Status: COMPLETED | OUTPATIENT
Start: 2023-11-16 | End: 2023-11-16

## 2023-11-16 RX ORDER — PANTOPRAZOLE SODIUM 20 MG/1
40 TABLET, DELAYED RELEASE ORAL
Refills: 0 | Status: DISCONTINUED | OUTPATIENT
Start: 2023-11-16 | End: 2023-11-17

## 2023-11-16 RX ORDER — ASPIRIN/CALCIUM CARB/MAGNESIUM 324 MG
325 TABLET ORAL
Refills: 0 | Status: DISCONTINUED | OUTPATIENT
Start: 2023-11-16 | End: 2023-11-17

## 2023-11-16 RX ORDER — OXYCODONE HYDROCHLORIDE 5 MG/1
5 TABLET ORAL EVERY 4 HOURS
Refills: 0 | Status: DISCONTINUED | OUTPATIENT
Start: 2023-11-16 | End: 2023-11-17

## 2023-11-16 RX ORDER — SODIUM CHLORIDE 9 MG/ML
500 INJECTION INTRAMUSCULAR; INTRAVENOUS; SUBCUTANEOUS ONCE
Refills: 0 | Status: DISCONTINUED | OUTPATIENT
Start: 2023-11-16 | End: 2023-11-17

## 2023-11-16 RX ORDER — DICLOFENAC SODIUM 75 MG/1
1 TABLET, DELAYED RELEASE ORAL
Refills: 0 | DISCHARGE

## 2023-11-16 RX ORDER — DEXAMETHASONE 0.5 MG/5ML
8 ELIXIR ORAL ONCE
Refills: 0 | Status: COMPLETED | OUTPATIENT
Start: 2023-11-17 | End: 2023-11-17

## 2023-11-16 RX ORDER — ATORVASTATIN CALCIUM 80 MG/1
10 TABLET, FILM COATED ORAL AT BEDTIME
Refills: 0 | Status: DISCONTINUED | OUTPATIENT
Start: 2023-11-16 | End: 2023-11-17

## 2023-11-16 RX ORDER — ACETAMINOPHEN 500 MG
1000 TABLET ORAL ONCE
Refills: 0 | Status: DISCONTINUED | OUTPATIENT
Start: 2023-11-16 | End: 2023-11-16

## 2023-11-16 RX ORDER — SENNA PLUS 8.6 MG/1
2 TABLET ORAL AT BEDTIME
Refills: 0 | Status: DISCONTINUED | OUTPATIENT
Start: 2023-11-16 | End: 2023-11-17

## 2023-11-16 RX ORDER — ONDANSETRON 8 MG/1
4 TABLET, FILM COATED ORAL ONCE
Refills: 0 | Status: DISCONTINUED | OUTPATIENT
Start: 2023-11-16 | End: 2023-11-16

## 2023-11-16 RX ORDER — ASPIRIN/CALCIUM CARB/MAGNESIUM 324 MG
325 TABLET ORAL
Refills: 0 | Status: DISCONTINUED | OUTPATIENT
Start: 2023-11-16 | End: 2023-11-16

## 2023-11-16 RX ORDER — ACETAMINOPHEN 500 MG
1000 TABLET ORAL ONCE
Refills: 0 | Status: COMPLETED | OUTPATIENT
Start: 2023-11-17 | End: 2023-11-17

## 2023-11-16 RX ORDER — OXYCODONE HYDROCHLORIDE 5 MG/1
10 TABLET ORAL EVERY 4 HOURS
Refills: 0 | Status: DISCONTINUED | OUTPATIENT
Start: 2023-11-16 | End: 2023-11-17

## 2023-11-16 RX ORDER — PENTOXIFYLLINE 400 MG
1 TABLET, EXTENDED RELEASE ORAL
Refills: 0 | DISCHARGE

## 2023-11-16 RX ORDER — FOLIC ACID 0.8 MG
1 TABLET ORAL DAILY
Refills: 0 | Status: DISCONTINUED | OUTPATIENT
Start: 2023-11-16 | End: 2023-11-17

## 2023-11-16 RX ORDER — ACETAMINOPHEN 500 MG
975 TABLET ORAL EVERY 8 HOURS
Refills: 0 | Status: DISCONTINUED | OUTPATIENT
Start: 2023-11-17 | End: 2023-11-17

## 2023-11-16 RX ORDER — ONDANSETRON 8 MG/1
4 TABLET, FILM COATED ORAL EVERY 6 HOURS
Refills: 0 | Status: DISCONTINUED | OUTPATIENT
Start: 2023-11-16 | End: 2023-11-17

## 2023-11-16 RX ORDER — ACETAMINOPHEN 500 MG
1000 TABLET ORAL ONCE
Refills: 0 | Status: COMPLETED | OUTPATIENT
Start: 2023-11-16 | End: 2023-11-16

## 2023-11-16 RX ORDER — INFLUENZA VIRUS VACCINE 15; 15; 15; 15 UG/.5ML; UG/.5ML; UG/.5ML; UG/.5ML
0.7 SUSPENSION INTRAMUSCULAR ONCE
Refills: 0 | Status: DISCONTINUED | OUTPATIENT
Start: 2023-11-16 | End: 2023-11-17

## 2023-11-16 RX ORDER — MAGNESIUM HYDROXIDE 400 MG/1
30 TABLET, CHEWABLE ORAL DAILY
Refills: 0 | Status: DISCONTINUED | OUTPATIENT
Start: 2023-11-16 | End: 2023-11-17

## 2023-11-16 RX ORDER — ACETAMINOPHEN 500 MG
1000 TABLET ORAL EVERY 8 HOURS
Refills: 0 | Status: DISCONTINUED | OUTPATIENT
Start: 2023-11-16 | End: 2023-11-16

## 2023-11-16 RX ORDER — PENTOXIFYLLINE 400 MG
400 TABLET, EXTENDED RELEASE ORAL
Refills: 0 | Status: DISCONTINUED | OUTPATIENT
Start: 2023-11-16 | End: 2023-11-17

## 2023-11-16 RX ORDER — ATORVASTATIN CALCIUM 80 MG/1
1 TABLET, FILM COATED ORAL
Refills: 0 | DISCHARGE

## 2023-11-16 RX ORDER — CELECOXIB 200 MG/1
200 CAPSULE ORAL EVERY 12 HOURS
Refills: 0 | Status: DISCONTINUED | OUTPATIENT
Start: 2023-11-17 | End: 2023-11-17

## 2023-11-16 RX ORDER — HYDROMORPHONE HYDROCHLORIDE 2 MG/ML
0.25 INJECTION INTRAMUSCULAR; INTRAVENOUS; SUBCUTANEOUS
Refills: 0 | Status: DISCONTINUED | OUTPATIENT
Start: 2023-11-16 | End: 2023-11-16

## 2023-11-16 RX ORDER — ASCORBIC ACID 60 MG
500 TABLET,CHEWABLE ORAL
Refills: 0 | Status: DISCONTINUED | OUTPATIENT
Start: 2023-11-16 | End: 2023-11-17

## 2023-11-16 RX ADMIN — Medication 1 TABLET(S): at 19:45

## 2023-11-16 RX ADMIN — Medication 1 MILLIGRAM(S): at 19:46

## 2023-11-16 RX ADMIN — Medication 1 TABLET(S): at 21:27

## 2023-11-16 RX ADMIN — TRAMADOL HYDROCHLORIDE 50 MILLIGRAM(S): 50 TABLET ORAL at 10:08

## 2023-11-16 RX ADMIN — Medication 1000 MILLIGRAM(S): at 20:12

## 2023-11-16 RX ADMIN — Medication 400 MILLIGRAM(S): at 19:46

## 2023-11-16 RX ADMIN — SODIUM CHLORIDE 500 MILLILITER(S): 9 INJECTION INTRAMUSCULAR; INTRAVENOUS; SUBCUTANEOUS at 14:19

## 2023-11-16 RX ADMIN — SODIUM CHLORIDE 500 MILLILITER(S): 9 INJECTION INTRAMUSCULAR; INTRAVENOUS; SUBCUTANEOUS at 19:40

## 2023-11-16 RX ADMIN — Medication 325 MILLIGRAM(S): at 19:45

## 2023-11-16 RX ADMIN — PANTOPRAZOLE SODIUM 40 MILLIGRAM(S): 20 TABLET, DELAYED RELEASE ORAL at 10:08

## 2023-11-16 RX ADMIN — TRAMADOL HYDROCHLORIDE 50 MILLIGRAM(S): 50 TABLET ORAL at 22:57

## 2023-11-16 RX ADMIN — Medication 400 MILLIGRAM(S): at 19:42

## 2023-11-16 RX ADMIN — TRAMADOL HYDROCHLORIDE 50 MILLIGRAM(S): 50 TABLET ORAL at 23:27

## 2023-11-16 RX ADMIN — Medication 500 MILLIGRAM(S): at 19:46

## 2023-11-16 RX ADMIN — CHLORHEXIDINE GLUCONATE 1 APPLICATION(S): 213 SOLUTION TOPICAL at 09:57

## 2023-11-16 RX ADMIN — POLYETHYLENE GLYCOL 3350 17 GRAM(S): 17 POWDER, FOR SOLUTION ORAL at 21:26

## 2023-11-16 RX ADMIN — Medication 100 MILLIGRAM(S): at 20:16

## 2023-11-16 RX ADMIN — SENNA PLUS 2 TABLET(S): 8.6 TABLET ORAL at 21:26

## 2023-11-16 NOTE — DISCHARGE NOTE PROVIDER - CARE PROVIDER_API CALL
Benigno Edward  Orthopaedic Surgery  [address], [address]  [place] [address]  Phone: [phone]  Fax: [phone]  Established Patient  Follow Up Time:

## 2023-11-16 NOTE — OCCUPATIONAL THERAPY INITIAL EVALUATION ADULT - TRANSFER TRAINING, PT EVAL
Pt will independently perform toilet transfer with raised commode and least restrictive device within 4 weeks.

## 2023-11-16 NOTE — DISCHARGE NOTE PROVIDER - NSDCFUADDINST_GEN_ALL_CORE_FT
Please follow up with Dr. dEward at your pre-scheduled post-operative follow up appointment. (Call to confirm)  Dressing care:   -Keep dressing clean, dry, and intact. Do not remove bandage until post-operative appointment.  Continue to ambulate as tolerated to the LEFT leg.     Pain medications:   Standing:         -Acetaminophen 325mg - 3 tabs every 8 hours        -Naproxen 500mg - 1 tab every 12 hours  As needed:        -Tramadol 50mg - 1 tab every 8-12 hours - Take only if needed for MODERATE pain       -oxycodone 5mg - 1 tab every 4-6 hours - Take only if needed for SEVERE or BREAKTHROUGH pain    Other Medications: (Standing)  -Aspirin (Enteric Coated) 325mg every 12 hours - to prevent blood clots (for 6 weeks post operatively.)  -Protonix 40mg - 1 tab every 24 hours - to prevent stomach irritation/ulcers  -Senna 8.6mg - 2 pills every 24 hours - stool softener  -colace 100mg - 1 pill every 8 hours - stool softener.     Recommended to follow up with your primary care provider within 1-2 months of hospital discharge to discuss your recent surgery and any change to your medications.

## 2023-11-16 NOTE — PHYSICAL THERAPY INITIAL EVALUATION ADULT - GENERAL OBSERVATIONS, REHAB EVAL
Rec'd Rec'd semi supine in bed, NAD, VSS, A/Ox4, +IVL, +PACU monitoring, +BP cuff, +pulse ox, +abduction hip pillow, agreeable to PT.

## 2023-11-16 NOTE — OCCUPATIONAL THERAPY INITIAL EVALUATION ADULT - RANGE OF MOTION EXAMINATION, LOWER EXTREMITY
LLE AROM WFL assessed through functional mobility/Right LE Active ROM was WFL   (within functional limits)

## 2023-11-16 NOTE — PHYSICAL THERAPY INITIAL EVALUATION ADULT - PERTINENT HX OF CURRENT PROBLEM, REHAB EVAL
72 year old female presents for left total hip replacement posterior approach. Pt. reports experiencing chronic left hip and lower back pain. Now s/p L BRICE with Dr. Edward on 11/16/23.

## 2023-11-16 NOTE — DISCHARGE NOTE PROVIDER - NSDCFUSCHEDAPPT_GEN_ALL_CORE_FT
[facility]  ORTHOSURG [address] [facility]  Scheduled Appointment: 12/05/2023

## 2023-11-16 NOTE — OCCUPATIONAL THERAPY INITIAL EVALUATION ADULT - ADDITIONAL COMMENTS
Pt reports living in a private house with her , 7 steps to enter, flight inside, walk in shower. Pt reports being independent with ADLs and ambulating with a hiking stick for long distances.

## 2023-11-16 NOTE — OCCUPATIONAL THERAPY INITIAL EVALUATION ADULT - PERTINENT HX OF CURRENT PROBLEM, REHAB EVAL
72 year old female presents for left total hip replacement posterior approach. Pt. reports experiencing chronic left hip and lower back pain. Denies history of COVID infection. Not s/p L Total hip arthroplasty, posterior approach 11/16.

## 2023-11-16 NOTE — DISCHARGE NOTE PROVIDER - NSDCMRMEDTOKEN_GEN_ALL_CORE_FT
amLODIPine 2.5 mg oral tablet: 1 tab(s) orally once a day  atorvastatin 10 mg oral tablet: 1 tab(s) orally once a day  Biotin one tab oral once daily:   diclofenac sodium 75 mg oral delayed release tablet: 1 tab(s) orally 2 times a day  pentoxifylline 400 mg oral tablet, extended release: 1 tab(s) orally 2 times a day  Vitamin D3  one tab oral once daily:    acetaminophen 325 mg oral tablet: 3 tab(s) orally every 8 hours as needed for fever, H/A, mild pain  amLODIPine 2.5 mg oral tablet: 1 tab(s) orally once a day  aspirin 325 mg oral delayed release tablet: 1 tab(s) orally 2 times a day  atorvastatin 10 mg oral tablet: 1 tab(s) orally once a day  Biotin one tab oral once daily:   diclofenac sodium 75 mg oral delayed release tablet: 1 tab(s) orally 2 times a day  oxyCODONE 5 mg oral tablet: 1 tab(s) orally every 4 hours as needed for  severe pain MDD: 6  pantoprazole 40 mg oral delayed release tablet: 1 tab(s) orally once a day (before a meal)  pentoxifylline 400 mg oral tablet, extended release: 1 tab(s) orally 2 times a day  traMADol 50 mg oral tablet: 1 tab(s) orally every 6 hours as needed for  moderate pain MDD: 4  Vitamin D3  one tab oral once daily:

## 2023-11-16 NOTE — PHYSICAL THERAPY INITIAL EVALUATION ADULT - ADDITIONAL COMMENTS
Pt states she lives in a PH with her  with 7 total steps to enter, 1 flight of stairs inside to bedroom. Pt was independent with ADLs and functional mobility, ambulated mostly without an AD however would use a hiking stick for long distances. Pt owns a rolling walker.

## 2023-11-16 NOTE — DISCHARGE NOTE PROVIDER - CARE PROVIDERS DIRECT ADDRESSES
,[email or web address]

## 2023-11-16 NOTE — DISCHARGE NOTE PROVIDER - NSDCCPCAREPLAN_GEN_ALL_CORE_FT
PRINCIPAL DISCHARGE DIAGNOSIS  Diagnosis: OA (osteoarthritis)  Assessment and Plan of Treatment: LEFT HIP

## 2023-11-16 NOTE — DISCHARGE NOTE PROVIDER - HOSPITAL COURSE
History of Present Illness:   72 year old female presents for left total hip replacement posterior approach. Pt. reports experiencing chronic left hip and lower back pain.  Denies history of COVID infection.    Goals of Care: "I want to be able to walk again and be pain free"    PAST MEDICAL HISTORY:  Deviated septum   H/O sciatica   [place] (hard of hearing)   HTN (hypertension)   Hyperlipidemia   Lumbar degenerative disc disease   OA (osteoarthritis)   Raynauds disease.     PAST SURGICAL HISTORY:  History of placement of ear tubes.    Hospital Course:  After admission on 11/16 and receiving pre-operative parenteral prophylactic antibiotics, the patient underwent an uncomplicated Left total hip replacement with [employer] robotic assist with Dr. Edward. Patient tolerated the procedure well and was transferred to the recovery room in stable condition, with a stable neuro/vascular exam of the operated extremity.    Patient was placed on Aspirin 325 mg for DVT ppx, and was placed on Protonix 40 mg for GI protection.   Patient was made weight bearing as tolerated with the operative leg.     Typical Physical & occupational therapy modalities post surgery were performed by physical and occupational therapies, including ambulation training, range of motion, ADL's, abd transfers.  After progression of mobility guided by the PT/ OT staff,  the patient was felt to benefit from further rehabilitative care for restoration to level of function. This was felt to best be accomplished at [id number].  Discharge and Orthopedic Care instructions were delineated in the Discharge Plan and reviewed with the patient. All medications were delineated in the medication reconciliation tool and key points were reviewed with the patient. They were deemed stable from an Orthopedic & medical standpoint for discharge ***   History of Present Illness:   72 year old female presents for left total hip replacement posterior approach. Pt. reports experiencing chronic left hip and lower back pain.  Denies history of COVID infection.    Goals of Care: "I want to be able to walk again and be pain free"    PAST MEDICAL HISTORY:  Deviated septum   H/O sciatica   [place] (hard of hearing)   HTN (hypertension)   Hyperlipidemia   Lumbar degenerative disc disease   OA (osteoarthritis)   Raynauds disease.     PAST SURGICAL HISTORY:  History of placement of ear tubes.    Hospital Course:  After admission on 11/16 and receiving pre-operative parenteral prophylactic antibiotics, the patient underwent an uncomplicated Left total hip replacement with [employer] robotic assist with Dr. Edward. Patient tolerated the procedure well and was transferred to the recovery room in stable condition, with a stable neuro/vascular exam of the operated extremity.    Patient was placed on Aspirin 325 mg for DVT ppx, and was placed on Protonix 40 mg for GI protection.   Patient was made weight bearing as tolerated with the operative leg.     Typical Physical & occupational therapy modalities post surgery were performed by physical and occupational therapies, including ambulation training, range of motion, ADL's, abd transfers.  After progression of mobility guided by the PT/ OT staff,  the patient was felt to benefit from further rehabilitative care for restoration to level of function. This was felt to best be accomplished at home, with home physical therapy  Discharge and Orthopedic Care instructions were delineated in the Discharge Plan and reviewed with the patient. All medications were delineated in the medication reconciliation tool and key points were reviewed with the patient. They were deemed stable from an Orthopedic & medical standpoint for discharge home 11/17/2023.

## 2023-11-16 NOTE — CHART NOTE - NSCHARTNOTEFT_GEN_A_CORE
History  Chief Complaint   Patient presents with   • Medical Problem     Pt arrives via EMS from home, per ems pt's brother called due to pt being weak and unable to walk  Pt arrives noted to have slurred speech and unable to move left side     Patient is a 21-year-old female presenting for evaluation of strokelike symptoms  As per patient, she was her normal self until approximately 6 PM today when she began experiencing some left-sided weakness which caused her to trip and fall, striking her head but without losing consciousness  Afterwards, she continued to have some left-sided arm and leg weakness as well as some slurring of her speech, which seems to be getting progressively worse  Her brother initially thought that this was possibly a drug overdose as she has had similar symptoms in the past secondary to such, and because of this EMS was called  She is denying any AP/AC  She is denying any history of CVA/TIA  She denies any seizures  She describes her fall as purely mechanical in nature and denies any chest pain or difficulty breathing prior to the event or since  Prior to Admission Medications   Prescriptions Last Dose Informant Patient Reported? Taking? DULOXETINE HCL PO   Yes No   Sig: Take 60 mg by mouth 2 (two) times a day  Indications: Depression   Patient not taking: Reported on 9/14/2022   LORAZEPAM PO   Yes No   Sig: Take 1 mg by mouth daily at bedtime   and also can take 1 daily as needed for anxiety  Indications: Feeling Anxious   Patient not taking: Reported on 9/14/2022   LORazepam (ATIVAN) 1 mg tablet   No No   Sig: Take 1 tablet (1 mg total) by mouth daily at bedtime for 3 days   LORazepam (ATIVAN) 1 mg tablet   Yes No   LamoTRIgine (LAMICTAL PO)   Yes No   Sig: Take 1 tablet by mouth daily at bedtime pt taking 150mg daily at bedtime    acetaminophen (TYLENOL) 325 mg tablet   No No   Sig: Take 3 tablets (975 mg total) by mouth every 8 (eight) hours   Patient taking differently: Patient seen and evaluated in the recovery unit, accompanied by family at bedside. Resting without complaints. Pt states pain is well tolerated. No Chest Pain, SOB, N/V/D/HA.    T(C): 36 (11-16-23 @ 14:00), Max: 36.4 (11-16-23 @ 09:35)  HR: 63 (11-16-23 @ 15:15) (63 - 79)  BP: 99/55 (11-16-23 @ 15:15) (97/54 - 154/70)  RR: 14 (11-16-23 @ 15:00) (14 - 18)  SpO2: 96% (11-16-23 @ 15:15) (96% - 98%)  Wt(kg): --    Exam:  Alert and Oriented, No Acute Distress.   Card: +S1/S2, RRR  Pulm: CTAB  Laterality: L lower extremity  Abduction pillow in place   Aquacel dressing on L Hip clean, dry and intact.   Sensory: Sensation intact to light touch bilaterally   Motor: 5/5 (+) PF/DF/EHL/FHL  Calves soft, non-tender bilaterally  2+ DP/PT pulse  Lower extremities warm and well-perfused, cap refill < 3 sec.     Xray:   IMPRESSION: s/p L total hip arthroplasty   IMPRESSION:  Cementless left total hip prosthesis with screw fixated acetabular cup ultimately implanted.  Intact and aligned hardware and no periprosthetic fractures.  Postoperative soft tissue changes.  Correlate with intraoperative findings.    Labs:    A/P: 72yFemale S/p L Total hip arthroplasty (posterior approach). VSS. NAD.  -PT/OT- WBAT on LLE/OOB With Posterior Hip Precautions  -IS bedside   -DVT PPx: Aspirin 325 mg BID, SCD, Early OOB and Amb  -GI PPx: Protonix 40 mg   -Pain Control  -Continue Current Tx  -f/u AM labs.   -Dispo planning: PACU to Floor, pending PT/OT eval.     Carter Méndez PA-C  Orthopedic Surgery Team  Team Pager #[phone] Take 975 mg by mouth every 8 (eight) hours pt takes 500mg tabs 1 to 2 tabs 3x day every 8 hours if needed   albuterol (PROVENTIL HFA,VENTOLIN HFA) 90 mcg/act inhaler   Yes No   Sig: Inhale 2 puffs every 6 (six) hours as needed for wheezing   Patient not taking: Reported on 2022   buPROPion (WELLBUTRIN) 100 mg tablet   Yes No   Sig: Take 100 mg by mouth 2 (two) times a day   carvedilol (COREG) 25 mg tablet   Yes No   Sig: Take 25 mg by mouth 2 (two) times a day with meals   gabapentin (NEURONTIN) 300 mg capsule   No No   Sig: Take 1 capsule (300 mg total) by mouth 2 (two) times a day   Patient not taking: Reported on 2022   gabapentin (Neurontin) 300 mg capsule   No No   Sig: Take 1 capsule (300 mg total) by mouth 2 (two) times a day   hydroxychloroquine (PLAQUENIL) 200 mg tablet   No No   Sig: Take 1 tablet (200mg) every Mon, Tues, Wed, Thurs, and Fri  Take 2 tablets (400mg) every Sat and Sun    lidocaine (Lidoderm) 5 %   No No   Sig: Apply 1 patch topically daily Remove & Discard patch within 12 hours or as directed by MD   Patient not taking: Reported on 2022   morphine (MS CONTIN) 15 mg 12 hr tablet   Yes No   Sig: Take 15 mg by mouth 2 (two) times a day    naloxone (NARCAN) 4 mg/0 1 mL nasal spray   No No   Sig: Administer 1 spray into a nostril  If no response after 2-3 minutes, give another dose in the other nostril using a new spray  Patient not taking: Reported on 2022   neomycin-bacitracin-polymyxin (NEOSPORIN) 5-400-5,000 ointment   No No   Sig: Apply topically 3 (three) times a day   Patient not taking: Reported on 2022   oxyCODONE (Roxicodone) 5 immediate release tablet   No No   Si 5 mg to 5 mg PO every 6 hours hours as needed for moderate to severe pain  Ongoing therapy     Patient not taking: Reported on 2022   pantoprazole (PROTONIX) 40 mg tablet   No No   Sig: Take 1 tablet (40 mg total) by mouth daily in the early morning   Patient not taking: Reported on 2022   polyethylene glycol (MIRALAX) 17 g packet   No No   Sig: Take 17 g by mouth daily as needed (Constipation)   Patient not taking: Reported on 2022   raNITIdine HCl (ZANTAC PO)   Yes No   Sig: Take by mouth   senna-docusate sodium (SENOKOT S) 8 6-50 mg per tablet   No No   Sig: Take 1 tablet by mouth 2 (two) times a day   Patient not taking: Reported on 2022   sodium chloride (OCEAN) 0 65 % nasal spray   No No   Si spray into each nostril as needed for congestion   Patient not taking: Reported on 2022      Facility-Administered Medications: None       Past Medical History:   Diagnosis Date   • Anxiety    • Asthma    • Bipolar depression ([facility] [address] )    • Chronic narcotic dependence (HCC)    • Chronic pain    • Depression    • Enterovirus heart infection    • SHAR (obstructive sleep apnea)    • Osteoarthritis    • Peripheral neuropathy    • Plantar fasciitis of right foot    • Senile osteoporosis    • Seronegative arthropathy of multiple sites ([facility] )    • Undifferentiated connective tissue disease ([facility] )        Past Surgical History:   Procedure Laterality Date   • BREAST IMPLANT     • FIXATION KYPHOPLASTY LUMBAR SPINE     • HYSTERECTOMY     • INCONTINENCE SURGERY N/A    • NASAL SEPTOPLASTY W/ TURBINOPLASTY N/A    • RECTAL PROLAPSE REPAIR N/A    • REPAIR RECTOCELE     • RHINOPLASTY N/A        Family History   Problem Relation Age of Onset   • No Known Problems Mother    • No Known Problems Father    • Arthritis Family    • Diabetes Family    • Stroke Family      I have reviewed and agree with the history as documented      E-Cigarette/Vaping   • E-Cigarette Use Never User      E-Cigarette/Vaping Substances   • Nicotine No    • THC No    • CBD No    • Flavoring No    • Other No    • Unknown No      Social History     Tobacco Use   • Smoking status: Former   • Smokeless tobacco: Never   Vaping Use   • Vaping Use: Never used   Substance Use Topics   • Alcohol use: Not Currently     Comment: quit   • Patient seen and evaluated in the recovery unit, accompanied by family at bedside. Resting without complaints. Pt states pain is well tolerated. No Chest Pain, SOB, N/V/D/HA.    T(C): 36 (11-16-23 @ 14:00), Max: 36.4 (11-16-23 @ 09:35)  HR: 63 (11-16-23 @ 15:15) (63 - 79)  BP: 99/55 (11-16-23 @ 15:15) (97/54 - 154/70)  RR: 14 (11-16-23 @ 15:00) (14 - 18)  SpO2: 96% (11-16-23 @ 15:15) (96% - 98%)  Wt(kg): --    Exam:  Alert and Oriented, No Acute Distress.   Card: +S1/S2, RRR  Pulm: CTAB  Laterality: L lower extremity  Abduction pillow in place   Aquacel dressing on L Hip clean, dry and intact.   Sensory: Sensation intact to light touch bilaterally   Motor: (+) strong PF/FHL (+) weak DF/EHL  Calves soft, non-tender bilaterally  2+ DP/PT pulse  Lower extremities warm and well-perfused, cap refill < 3 sec.     Xray:   IMPRESSION: s/p L total hip arthroplasty   Cementless left total hip prosthesis with screw fixated acetabular cup ultimately implanted.  Intact and aligned hardware and no periprosthetic fractures.  Postoperative soft tissue changes.  Correlate with intraoperative findings.    Labs:    A/P: 72yFemale S/p L Total hip arthroplasty (posterior approach). VSS. NAD.  -PT/OT- WBAT on LLE/OOB With Posterior Hip Precautions  -IS bedside   -DVT PPx: Aspirin 325 mg BID, SCD, Early OOB and Amb  -GI PPx: Protonix 40 mg   -Pain Control  -Continue Current Tx  -f/u AM labs.   -Dispo planning: PACU to Floor, pending PT/OT eval.     Carter Méndez PA-C  Orthopedic Surgery Team  Team Pager #[phone] Drug use: Yes     Types: Marijuana     Comment: has medical card        Review of Systems   Constitutional: Negative for chills and fever  Eyes: Negative for visual disturbance  Respiratory: Negative for cough and shortness of breath  Cardiovascular: Negative for chest pain  Gastrointestinal: Negative for abdominal pain, constipation, diarrhea, nausea and vomiting  Genitourinary: Negative for dysuria  Musculoskeletal: Positive for back pain and neck pain  Neurological: Positive for facial asymmetry, speech difficulty and weakness  Negative for headaches  All other systems reviewed and are negative  Physical Exam  ED Triage Vitals   Temperature Pulse Respirations Blood Pressure SpO2   02/12/23 0352 02/12/23 0303 02/12/23 0303 02/12/23 0303 02/12/23 0303   98 1 °F (36 7 °C) 85 18 133/75 97 %      Temp src Heart Rate Source Patient Position - Orthostatic VS BP Location FiO2 (%)   -- 02/12/23 0303 02/12/23 0303 02/12/23 0303 --    Monitor Lying Left arm       Pain Score       --                    Orthostatic Vital Signs  Vitals:    02/12/23 0350 02/12/23 0409 02/12/23 0420 02/12/23 0430   BP: 140/78 143/76 133/72 141/79   Pulse: 73 75 77 77   Patient Position - Orthostatic VS: Lying Lying Lying Lying       Physical Exam  Vitals and nursing note reviewed  Constitutional:       General: She is not in acute distress  Appearance: Normal appearance  She is not ill-appearing or toxic-appearing  HENT:      Head: Normocephalic and atraumatic  Right Ear: External ear normal       Left Ear: External ear normal       Nose: Nose normal    Eyes:      General: No scleral icterus  Right eye: No discharge  Left eye: No discharge  Extraocular Movements: Extraocular movements intact  Conjunctiva/sclera: Conjunctivae normal    Cardiovascular:      Rate and Rhythm: Normal rate and regular rhythm  Heart sounds: Normal heart sounds  No murmur heard  No friction rub   No gallop  Pulmonary:      Effort: Pulmonary effort is normal  No respiratory distress  Breath sounds: Normal breath sounds  Abdominal:      General: Abdomen is flat  There is no distension  Palpations: Abdomen is soft  There is no mass  Tenderness: There is no abdominal tenderness  Genitourinary:     Comments: Deferred  Musculoskeletal:      Cervical back: Tenderness present  Comments: There is diffuse tenderness of the entire vertebral spine without any deformities or step-offs  Skin:     General: Skin is warm and dry  Neurological:      Mental Status: She is alert and oriented to person, place, and time  Cranial Nerves: Facial asymmetry (Left-sided facial droop) present  Motor: Weakness present  Comments: Patient has complete flaccid paralysis of her left upper and left lower extremity  Has a left-sided facial droop  She has dysarthria  She has a right-sided gaze preference  ED Medications  Medications   diphenhydrAMINE (BENADRYL) injection 25 mg (25 mg Intravenous Given 2/12/23 0314)   iohexol (OMNIPAQUE) 350 MG/ML injection (MULTI-DOSE) 100 mL (100 mL Intravenous Given 2/12/23 0340)       Diagnostic Studies  Results Reviewed     Procedure Component Value Units Date/Time    FLU/RSV/COVID - if FLU/RSV clinically relevant [[id number]]  (Normal) Collected: 02/12/23 0340    Lab Status: Final result Specimen: Nares from Nose Updated: 02/12/23 0424     SARS-CoV-2 Negative     INFLUENZA A PCR Negative     INFLUENZA B PCR Negative     RSV PCR Negative    Narrative:      FOR PEDIATRIC PATIENTS - copy/paste COVID Guidelines URL to browser: [email or web address]  ashx    SARS-CoV-2 assay is a Nucleic Acid Amplification assay intended for the  qualitative detection of nucleic acid from SARS-CoV-2 in nasopharyngeal  swabs  Results are for the presumptive identification of SARS-CoV-2 RNA      Positive results are indicative of infection with SARS-CoV-2, the virus  causing COVID-19, but do not rule out bacterial infection or co-infection  with other viruses  Laboratories within the [place] and its  territories are required to report all positive results to the appropriate  public health authorities  Negative results do not preclude SARS-CoV-2  infection and should not be used as the sole basis for treatment or other  patient management decisions  Negative results must be combined with  clinical observations, patient history, and epidemiological information  This test has not been FDA cleared or approved  This test has been authorized by FDA under an Emergency Use Authorization  (EUA)  This test is only authorized for the duration of time the  declaration that circumstances exist justifying the authorization of the  emergency use of an in vitro diagnostic tests for detection of SARS-CoV-2  virus and/or diagnosis of COVID-19 infection under section 564(b)(1) of  the Act, 21 U  S C  057EDJ-5(R)(9), unless the authorization is terminated  or revoked sooner  The test has been validated but independent review by FDA  and CLIA is pending  Test performed using [employer]pert: This RT-PCR assay targets N2,  a region unique to SARS-CoV-2  A conserved region in the E-gene was chosen  for pan-Sarbecovirus detection which includes SARS-CoV-2  According to CMS-2020-01-R, this platform meets the definition of high-throughput technology      HS Troponin 0hr (reflex protocol) [[id number]]  (Normal) Collected: 02/12/23 0302    Lab Status: Final result Specimen: Blood from Arm, Right Updated: 02/12/23 0333     hs TnI 0hr 12 ng/L     Basic metabolic panel [[id number]]  (Abnormal) Collected: 02/12/23 0302    Lab Status: Final result Specimen: Blood from Arm, Right Updated: 02/12/23 0330     Sodium 141 mmol/L      Potassium 3 9 mmol/L      Chloride 109 mmol/L      CO2 28 mmol/L      ANION GAP 4 mmol/L      BUN 13 mg/dL      Creatinine 0 94 mg/dL Glucose 98 mg/dL      Calcium 10 8 mg/dL      eGFR 62 ml/min/1 73sq m     Narrative:      [place] guidelines for Chronic Kidney Disease (CKD):   •  Stage 1 with normal or high GFR (GFR > 90 mL/min/1 73 square meters)  •  Stage 2 Mild CKD (GFR = 60-89 mL/min/1 73 square meters)  •  Stage 3A Moderate CKD (GFR = 45-59 mL/min/1 73 square meters)  •  Stage 3B Moderate CKD (GFR = 30-44 mL/min/1 73 square meters)  •  Stage 4 Severe CKD (GFR = 15-29 mL/min/1 73 square meters)  •  Stage 5 End Stage CKD (GFR <15 mL/min/1 73 square meters)  Note: GFR calculation is accurate only with a steady state creatinine    Protime-INR [[id number]]  (Normal) Collected: 02/12/23 0302    Lab Status: Final result Specimen: Blood from Arm, Right Updated: 02/12/23 0322     Protime 12 1 seconds      INR 0 90    APTT [[id number]]  (Normal) Collected: 02/12/23 0302    Lab Status: Final result Specimen: Blood from Arm, Right Updated: 02/12/23 0322     PTT 24 seconds     CBC and Platelet [[id number]]  (Normal) Collected: 02/12/23 0302    Lab Status: Final result Specimen: Blood from Arm, Right Updated: 02/12/23 0309     WBC 6 86 Thousand/uL      RBC 4 66 Million/uL      Hemoglobin 14 5 g/dL      Hematocrit 44 5 %      MCV 96 fL      MCH 31 1 pg      MCHC 32 6 g/dL      RDW 13 8 %      Platelets 247 Thousands/uL      MPV 9 8 fL     Fingerstick Glucose (POCT) [[id number]]  (Normal) Collected: 02/12/23 0259    Lab Status: Final result Updated: 02/12/23 0305     POC Glucose 113 mg/dl                  CT chest abdomen pelvis w contrast   Final Result by Zach Chandler MD (02/12 1190)      No evidence of solid organ injury  No acute intra-abdominal abnormality  No free air or free fluid  Bibasilar atelectasis with no infiltrate or consolidation  No pneumothorax  Stable chronic right posterior 10th, 11th and 12th rib fractures  Stable chronic compression fractures at T11, L1 and L4  No new fracture  Workstation performed: [id number]         CT recon only thoracolumbar   Final Result by Sourav Eli MD (02/12 8837)      No fracture or traumatic subluxation  Stable chronic compression fractures at T11, L1 and L4  Stable old right transverse process fractures at L2 and L3  Workstation performed: [id number]         CTA stroke alert (head/neck)   Final Result by Sourav Eli MD (02/12 6480)      Abrupt cut off of flow at the distal right MCA M1 segment compatible with an acute infarct  Collateralized blood flow is noted within the distal right MCA branches  3 mm saccular aneurysm arising from the medial aspect of the proximal right ICA supraclinoid segment  No hemodynamically significant stenosis within either common or internal carotid artery  Less than 50% stenosis by NASCET criteria  I personally discussed this study with Patience Spikes on 2/12/2023 at 3:20 AM                            Workstation performed: [id number]         CT stroke alert brain   Final Result by Sourav Eli MD (02/12 9716)      Area of loss of the gray-white matter differentiation involving the right parietotemporal region suspicious for acute ischemia  Occlusion of the distal right MCA M1 segment is also noted on a CTA head and neck performed at same time, compatible with an    acute infarct  An MRI brain could be performed for further evaluation  No acute intracranial hemorrhage  Mild chronic small vessel ischemic changes  I personally discussed this study with Patience Spikes on 2/12/2023 at 3:20 AM                Workstation performed: [id number]               Procedures  Procedures      ED Course  ED Course as of 02/12/23 0700   Sun Feb 12, 2023   0310 Patient with listed contrast allergy  Confirmed with patient that this was some hives without shortness of breath or anaphylaxis   Will pretreat with benadryl and perform stroke alert as benefits outweigh risks  Patient aware, understands, and is agreeable  1977 I discussed the M1 occlusion with the neurologist who will be contacting the neuro interventional list to see if the patient is appropriate for transfer to [place] for intervention  She is outside of the window for thrombolytics as her last known well was 6 PM    0417 Procedure Note: EKG  Date/Time: 02/12/23 4:17 AM   Interpreted by: Ruth Fernandez   Indications / Diagnosis: stroke alert  ECG reviewed by me, the ED Provider: yes   The EKG demonstrates:  Rhythm: normal sinus  Intervals: normal intervals  Axis: normal axis  QRS/Blocks: normal QRS  ST Changes: No acute ST Changes, no STD/TAB  New t wave inversions in anterior leads   0425 Endovascular alert activated and patient to be transferred to [place]  Dr Valentino Leaver accepting  As per neurology, goal to increased BP to -180, recommending kelvin to do so  This was ordered and will be initiated  Stroke Assessment     Row Name 02/12/23 0329             NIH Stroke Scale    Interval Baseline      Level of Consciousness (1a ) 0      LOC Questions (1b ) 0      LOC Commands (1c ) 0      Best Gaze (2 ) 1      Visual (3 ) 0      Facial Palsy (4 ) 2      Motor Arm, Left (5a ) 4      Motor Arm, Right (5b ) 0      Motor Leg, Left (6a ) 4      Motor Leg, Right (6b ) 0      Limb Ataxia (7 ) 1      Sensory (8 ) 0      Best Language (9 ) 0      Dysarthria (10 ) 1      Extinction and Inattention (11 ) (Formerly Neglect) 0      Total 13              Flowsheet Row Most Recent Value   Thrombolytic Decision Options    Thrombolytic Decision Patient not a candidate  Patient is not a candidate options Unclear time of onset outside appropriate time window  SBIRT 20yo+    Flowsheet Row Most Recent Value   SBIRT (25 yo +)    In order to provide better care to our patients, we are screening all of our patients for alcohol and drug use   Would it be okay to ask you these screening questions? No Filed at: 02/12/2023 0413                Medical Decision Making  Patient is a 78year old female presenting with symptoms concerning for acute CVA versus TIA  Other items on the differential include dissection, AMI, hypoglycemia or other metabolic derangement such as hepatic/uremic encephalopathy, medication side effect, or post-ictal Darryl's paralysis  However, presentation most concerning for a CVA  EKG without evidence of STEMI or ischemia, fingerstick BS not hypoglycemic, and clinical picture does not suggest other stroke mimic  Plan to workup for acute CVA / TIA  NIH 13, see above  Plan: stroke alert, CT head stroke protocol, stroke labs, Neurology stroke consult    Imaging remarkable for a M1 occlusion  Suspect that the patient's symptoms are secondary to this  The patient is outside of the window for thrombolytics  She is however within a 24-hour time frame of symptom onset  I discussed this with on-call neurology who spoke to the neuro interventional list who is agreeable to activate the endovascular alert  Recommending kelvin infusion and straight down and Trendelenburg position and transfer to [place] for intervention  Patient seems to understand this plan and is agreeable  All questions answered  Patient transferred  Stroke-like episode: acute illness or injury  Amount and/or Complexity of Data Reviewed  Labs: ordered  Radiology: ordered  Risk  Prescription drug management              Disposition  Final diagnoses:   Stroke-like episode     Time reflects when diagnosis was documented in both MDM as applicable and the Disposition within this note     Time User Action Codes Description Comment    2/12/2023  3:00 AM Christiana Oden Add [R29 90] Stroke-like episode       ED Disposition     ED Disposition   Transfer to Another Facility-In Network    Condition   --    Date/Time   Sun Feb 12, 2023  4:15 AM    Comment   Farnaz Stone should be transferred out to [facility] Most Recent Value   Patient Condition The patient has been stabilized such that within reasonable medical probability, no material deterioration of the patient condition or the condition of the unborn child(michael) is likely to result from the transfer   Reason for Transfer Level of Care needed not available at this facility   Benefits of Transfer Specialized equipment and/or services available at the receiving facility (Include comment)________________________  [Neurosurgery]   Risks of Transfer Potential for delay in receiving treatment, Potential deterioration of medical condition, Loss of IV, Increased discomfort during transfer, Possible worsening of condition or death during transfer   Accepting Physician Dr Maile Storey Name, [place], [place]   Sending MD Burt   Provider Certification General risk, such as traffic hazards, adverse weather conditions, rough terrain or turbulence, possible failure of equipment (including vehicle or aircraft), or consequences of actions of persons outside the control of the transport personnel, Unanticipated needs of medical equipment and personnel during transport, Risk of worsening condition, The possibility of a transport vehicle being unavailable      RN Documentation    Flowsheet Row Most [address] [facility] Name, [place], [place]   Transport Mode Ambulance   Level of Care CCT-Nurse   Transfer Date 02/12/23      Follow-up Information    None         Discharge Medication List as of 2/12/2023  4:44 AM      CONTINUE these medications which have NOT CHANGED    Details   acetaminophen (TYLENOL) 325 mg tablet Take 3 tablets (975 mg total) by mouth every 8 (eight) hours, Starting Tue 3/8/2022, No Print      albuterol (PROVENTIL HFA,VENTOLIN HFA) 90 mcg/act inhaler Inhale 2 puffs every 6 (six) hours as needed for wheezing, Historical Med      buPROPion [facility]) 100 mg tablet Take 100 mg by mouth 2 (two) times a day, Historical Med      carvedilol (COREG) 25 mg tablet Take 25 mg by mouth 2 (two) times a day with meals, Historical Med      DULOXETINE HCL PO Take 60 mg by mouth 2 (two) times a day  Indications: Depression, Historical Med      !! gabapentin (NEURONTIN) 300 mg capsule Take 1 capsule (300 mg total) by mouth 2 (two) times a day, Starting Mon 6/28/2021, Normal      !! gabapentin (Neurontin) 300 mg capsule Take 1 capsule (300 mg total) by mouth 2 (two) times a day, Starting Thu 4/7/2022, Normal      hydroxychloroquine (PLAQUENIL) 200 mg tablet Take 1 tablet (200mg) every Mon, Tues, Wed, Thurs, and Fri  Take 2 tablets (400mg) every Sat and Sun , Normal      LamoTRIgine (LAMICTAL PO) Take 1 tablet by mouth daily at bedtime pt taking 150mg daily at bedtime , Historical Med      lidocaine (Lidoderm) 5 % Apply 1 patch topically daily Remove & Discard patch within 12 hours or as directed by MD, Starting Wed 3/9/2022, No Print      !! LORazepam (ATIVAN) 1 mg tablet Historical Med      !! LORAZEPAM PO Take 1 mg by mouth daily at bedtime  and also can take 1 daily as needed for anxiety  Indications: Feeling Anxious, Historical Med      morphine (MS CONTIN) 15 mg 12 hr tablet Take 15 mg by mouth 2 (two) times a day , Historical Med      naloxone (NARCAN) 4 mg/0 1 mL nasal spray Administer 1 spray into a nostril  If no response after 2-3 minutes, give another dose in the other nostril using a new spray , Normal      neomycin-bacitracin-polymyxin (NEOSPORIN) 5-400-5,000 ointment Apply topically 3 (three) times a day, Starting Wed 7/21/2021, Print      oxyCODONE (Roxicodone) 5 immediate release tablet 2 5 mg to 5 mg PO every 6 hours hours as needed for moderate to severe pain   Ongoing therapy , Normal      pantoprazole (PROTONIX) 40 mg tablet Take 1 tablet (40 mg total) by mouth daily in the early morning, Starting Tue 6/29/2021, Normal      polyethylene glycol (MIRALAX) 17 g packet Take 17 g by mouth daily as needed (Constipation), Starting Tue 3/8/2022, No Print      raNITIdine HCl (ZANTAC PO) Take by mouth, Historical Med      senna-docusate sodium (SENOKOT S) 8 6-50 mg per tablet Take 1 tablet by mouth 2 (two) times a day, Starting Tue 3/8/2022, No Print      sodium chloride (OCEAN) 0 65 % nasal spray 1 spray into each nostril as needed for congestion, Starting Tue 3/8/2022, No Print       !! - Potential duplicate medications found  Please discuss with provider  No discharge procedures on file  PDMP Review       Value Time User    PDMP Reviewed  Yes 4/25/2022 11:46 AM Ralph Anglin MD           ED Provider  Attending physically available and evaluated Corby Rondonh  I managed the patient along with the ED Attending      Electronically Signed by         Elda Garcia DO  02/12/23 [id number]

## 2023-11-17 ENCOUNTER — TRANSCRIPTION ENCOUNTER (OUTPATIENT)
Age: 73
End: 2023-11-17

## 2023-11-17 VITALS
RESPIRATION RATE: 18 BRPM | SYSTOLIC BLOOD PRESSURE: 120 MMHG | OXYGEN SATURATION: 98 % | HEART RATE: 97 BPM | TEMPERATURE: 98 F | DIASTOLIC BLOOD PRESSURE: 78 MMHG

## 2023-11-17 LAB
BUN SERPL-MCNC: 14 MG/DL — SIGNIFICANT CHANGE UP (ref 7–23)
BUN SERPL-MCNC: 14 MG/DL — SIGNIFICANT CHANGE UP (ref 7–23)
CALCIUM SERPL-MCNC: 9.1 MG/DL — SIGNIFICANT CHANGE UP (ref 8.4–10.5)
CALCIUM SERPL-MCNC: 9.1 MG/DL — SIGNIFICANT CHANGE UP (ref 8.4–10.5)
CHLORIDE SERPL-SCNC: 105 MMOL/L — SIGNIFICANT CHANGE UP (ref 96–108)
CHLORIDE SERPL-SCNC: 105 MMOL/L — SIGNIFICANT CHANGE UP (ref 96–108)
CO2 SERPL-SCNC: 24 MMOL/L — SIGNIFICANT CHANGE UP (ref 22–31)
CO2 SERPL-SCNC: 24 MMOL/L — SIGNIFICANT CHANGE UP (ref 22–31)
CREAT SERPL-MCNC: 0.63 MG/DL — SIGNIFICANT CHANGE UP (ref 0.5–1.3)
CREAT SERPL-MCNC: 0.63 MG/DL — SIGNIFICANT CHANGE UP (ref 0.5–1.3)
EGFR: 95 ML/MIN/1.73M2 — SIGNIFICANT CHANGE UP
EGFR: 95 ML/MIN/1.73M2 — SIGNIFICANT CHANGE UP
GLUCOSE SERPL-MCNC: 131 MG/DL — HIGH (ref 70–99)
GLUCOSE SERPL-MCNC: 131 MG/DL — HIGH (ref 70–99)
HCT VFR BLD CALC: 38.4 % — SIGNIFICANT CHANGE UP (ref 34.5–45)
HCT VFR BLD CALC: 38.4 % — SIGNIFICANT CHANGE UP (ref 34.5–45)
HGB BLD-MCNC: 12.5 G/DL — SIGNIFICANT CHANGE UP (ref 11.5–15.5)
HGB BLD-MCNC: 12.5 G/DL — SIGNIFICANT CHANGE UP (ref 11.5–15.5)
MCHC RBC-ENTMCNC: 29.8 PG — SIGNIFICANT CHANGE UP (ref 27–34)
MCHC RBC-ENTMCNC: 29.8 PG — SIGNIFICANT CHANGE UP (ref 27–34)
MCHC RBC-ENTMCNC: 32.6 GM/DL — SIGNIFICANT CHANGE UP (ref 32–36)
MCHC RBC-ENTMCNC: 32.6 GM/DL — SIGNIFICANT CHANGE UP (ref 32–36)
MCV RBC AUTO: 91.6 FL — SIGNIFICANT CHANGE UP (ref 80–100)
MCV RBC AUTO: 91.6 FL — SIGNIFICANT CHANGE UP (ref 80–100)
NRBC # BLD: 0 /100 WBCS — SIGNIFICANT CHANGE UP (ref 0–0)
NRBC # BLD: 0 /100 WBCS — SIGNIFICANT CHANGE UP (ref 0–0)
PLATELET # BLD AUTO: 198 K/UL — SIGNIFICANT CHANGE UP (ref 150–400)
PLATELET # BLD AUTO: 198 K/UL — SIGNIFICANT CHANGE UP (ref 150–400)
POTASSIUM SERPL-MCNC: 4.4 MMOL/L — SIGNIFICANT CHANGE UP (ref 3.5–5.3)
POTASSIUM SERPL-MCNC: 4.4 MMOL/L — SIGNIFICANT CHANGE UP (ref 3.5–5.3)
POTASSIUM SERPL-SCNC: 4.4 MMOL/L — SIGNIFICANT CHANGE UP (ref 3.5–5.3)
POTASSIUM SERPL-SCNC: 4.4 MMOL/L — SIGNIFICANT CHANGE UP (ref 3.5–5.3)
RBC # BLD: 4.19 M/UL — SIGNIFICANT CHANGE UP (ref 3.8–5.2)
RBC # BLD: 4.19 M/UL — SIGNIFICANT CHANGE UP (ref 3.8–5.2)
RBC # FLD: 14 % — SIGNIFICANT CHANGE UP (ref 10.3–14.5)
RBC # FLD: 14 % — SIGNIFICANT CHANGE UP (ref 10.3–14.5)
SODIUM SERPL-SCNC: 137 MMOL/L — SIGNIFICANT CHANGE UP (ref 135–145)
SODIUM SERPL-SCNC: 137 MMOL/L — SIGNIFICANT CHANGE UP (ref 135–145)
WBC # BLD: 9.28 K/UL — SIGNIFICANT CHANGE UP (ref 3.8–10.5)
WBC # BLD: 9.28 K/UL — SIGNIFICANT CHANGE UP (ref 3.8–10.5)
WBC # FLD AUTO: 9.28 K/UL — SIGNIFICANT CHANGE UP (ref 3.8–10.5)
WBC # FLD AUTO: 9.28 K/UL — SIGNIFICANT CHANGE UP (ref 3.8–10.5)

## 2023-11-17 PROCEDURE — 73501 X-RAY EXAM HIP UNI 1 VIEW: CPT

## 2023-11-17 PROCEDURE — 97161 PT EVAL LOW COMPLEX 20 MIN: CPT

## 2023-11-17 PROCEDURE — C1776: CPT

## 2023-11-17 PROCEDURE — 27130 TOTAL HIP ARTHROPLASTY: CPT | Mod: LT

## 2023-11-17 PROCEDURE — 97166 OT EVAL MOD COMPLEX 45 MIN: CPT

## 2023-11-17 PROCEDURE — 72170 X-RAY EXAM OF PELVIS: CPT

## 2023-11-17 PROCEDURE — 82962 GLUCOSE BLOOD TEST: CPT

## 2023-11-17 PROCEDURE — 97530 THERAPEUTIC ACTIVITIES: CPT

## 2023-11-17 PROCEDURE — C1713: CPT

## 2023-11-17 PROCEDURE — 97116 GAIT TRAINING THERAPY: CPT

## 2023-11-17 PROCEDURE — 85027 COMPLETE CBC AUTOMATED: CPT

## 2023-11-17 PROCEDURE — 80048 BASIC METABOLIC PNL TOTAL CA: CPT

## 2023-11-17 RX ORDER — PANTOPRAZOLE SODIUM 20 MG/1
1 TABLET, DELAYED RELEASE ORAL
Qty: 30 | Refills: 0
Start: 2023-11-17 | End: 2023-12-16

## 2023-11-17 RX ORDER — OXYCODONE HYDROCHLORIDE 5 MG/1
1 TABLET ORAL
Qty: 42 | Refills: 0
Start: 2023-11-17 | End: 2023-11-23

## 2023-11-17 RX ORDER — ACETAMINOPHEN 500 MG
3 TABLET ORAL
Qty: 0 | Refills: 0 | DISCHARGE
Start: 2023-11-17

## 2023-11-17 RX ORDER — ASPIRIN/CALCIUM CARB/MAGNESIUM 324 MG
1 TABLET ORAL
Qty: 60 | Refills: 0
Start: 2023-11-17 | End: 2023-12-16

## 2023-11-17 RX ORDER — TRAMADOL HYDROCHLORIDE 50 MG/1
1 TABLET ORAL
Qty: 28 | Refills: 0
Start: 2023-11-17 | End: 2023-11-23

## 2023-11-17 RX ADMIN — Medication 1 MILLIGRAM(S): at 12:20

## 2023-11-17 RX ADMIN — PANTOPRAZOLE SODIUM 40 MILLIGRAM(S): 20 TABLET, DELAYED RELEASE ORAL at 06:21

## 2023-11-17 RX ADMIN — OXYCODONE HYDROCHLORIDE 5 MILLIGRAM(S): 5 TABLET ORAL at 06:20

## 2023-11-17 RX ADMIN — Medication 101.6 MILLIGRAM(S): at 06:20

## 2023-11-17 RX ADMIN — Medication 500 MILLIGRAM(S): at 06:28

## 2023-11-17 RX ADMIN — Medication 400 MILLIGRAM(S): at 06:21

## 2023-11-17 RX ADMIN — Medication 1000 MILLIGRAM(S): at 04:19

## 2023-11-17 RX ADMIN — Medication 400 MILLIGRAM(S): at 03:49

## 2023-11-17 RX ADMIN — CELECOXIB 200 MILLIGRAM(S): 200 CAPSULE ORAL at 06:50

## 2023-11-17 RX ADMIN — AMLODIPINE BESYLATE 2.5 MILLIGRAM(S): 2.5 TABLET ORAL at 06:28

## 2023-11-17 RX ADMIN — Medication 100 MILLIGRAM(S): at 03:49

## 2023-11-17 RX ADMIN — Medication 400 MILLIGRAM(S): at 12:18

## 2023-11-17 RX ADMIN — Medication 325 MILLIGRAM(S): at 06:20

## 2023-11-17 RX ADMIN — Medication 1 TABLET(S): at 06:21

## 2023-11-17 RX ADMIN — CELECOXIB 200 MILLIGRAM(S): 200 CAPSULE ORAL at 06:20

## 2023-11-17 RX ADMIN — Medication 1 TABLET(S): at 12:18

## 2023-11-17 RX ADMIN — OXYCODONE HYDROCHLORIDE 5 MILLIGRAM(S): 5 TABLET ORAL at 06:50

## 2023-11-17 NOTE — DISCHARGE NOTE NURSING/CASE MANAGEMENT/SOCIAL WORK - PATIENT PORTAL LINK FT
You can access the FollowMyHealth Patient Portal offered by [facility] by registering at the following website: [email or web address]. By joining [email or web address] FollowMyHealth portal, you will also be able to view your health information using other applications (apps) compatible with our system.

## 2023-11-17 NOTE — PROGRESS NOTE ADULT - SUBJECTIVE AND OBJECTIVE BOX
Pt seen/examined. Doing well. Pain controlled. No acute overnight complaints or events.    T(C): 36.5 (11-17-23 @ 04:20), Max: 36.5 (11-17-23 @ 04:20)  HR: 68 (11-17-23 @ 04:20) (63 - 79)  BP: 109/60 (11-17-23 @ 04:20) (95/53 - 154/70)  RR: 16 (11-17-23 @ 04:20) (14 - 18)  SpO2: 95% (11-17-23 @ 04:20) (95% - 100%)  Wt(kg): --  - Gen: NAD    Exam:  Alert and Oriented, No Acute Distress.   Card: +S1/S2, RRR  Pulm: CTAB  Laterality: L lower extremity  Abduction pillow in place   Aquacel dressing on L Hip w minimal strikethrough  Sensory: Sensation intact to light touch bilaterally   Motor: (+) strong PF/FHL (+) weak DF/EHL  Calves soft, non-tender bilaterally  2+ DP/PT pulse  Lower extremities warm and well-perfused, cap refill < 3 sec.     A/P: 72yFemale S/p L Total hip arthroplasty (posterior approach). VSS. NAD.    -PT/OT- WBAT on LLE/OOB With Posterior Hip Precautions  -IS bedside   -DVT PPx: Aspirin 325 mg BID, SCD, Early OOB and Amb  -GI PPx: Protonix 40 mg   -Pain Control  -Continue Current Tx  -f/u AM labs.   -Dispo planning: Home

## 2023-11-18 ENCOUNTER — TRANSCRIPTION ENCOUNTER (OUTPATIENT)
Age: 73
End: 2023-11-18

## 2023-11-19 ENCOUNTER — TRANSCRIPTION ENCOUNTER (OUTPATIENT)
Age: 73
End: 2023-11-19

## 2023-11-21 ENCOUNTER — TRANSCRIPTION ENCOUNTER (OUTPATIENT)
Age: 73
End: 2023-11-21

## 2023-11-27 ENCOUNTER — TRANSCRIPTION ENCOUNTER (OUTPATIENT)
Age: 73
End: 2023-11-27

## 2023-11-28 ENCOUNTER — APPOINTMENT (OUTPATIENT)
Dept: ORTHOPEDIC SURGERY | Facility: CLINIC | Age: 73
End: 2023-11-28
Payer: MEDICARE

## 2023-11-28 VITALS
HEIGHT: 64 IN | SYSTOLIC BLOOD PRESSURE: 139 MMHG | BODY MASS INDEX: 30.05 KG/M2 | WEIGHT: 176 LBS | DIASTOLIC BLOOD PRESSURE: 83 MMHG | HEART RATE: 80 BPM

## 2023-11-28 PROCEDURE — 73502 X-RAY EXAM HIP UNI 2-3 VIEWS: CPT

## 2023-11-28 PROCEDURE — 99024 POSTOP FOLLOW-UP VISIT: CPT

## 2023-12-04 ENCOUNTER — RX RENEWAL (OUTPATIENT)
Age: 73
End: 2023-12-04

## 2023-12-06 ENCOUNTER — TRANSCRIPTION ENCOUNTER (OUTPATIENT)
Age: 73
End: 2023-12-06

## 2023-12-10 ENCOUNTER — NON-APPOINTMENT (OUTPATIENT)
Age: 73
End: 2023-12-10

## 2023-12-12 ENCOUNTER — RX RENEWAL (OUTPATIENT)
Age: 73
End: 2023-12-12

## 2023-12-12 RX ORDER — PANTOPRAZOLE 40 MG/1
40 TABLET, DELAYED RELEASE ORAL DAILY
Qty: 90 | Refills: 0 | Status: ACTIVE | COMMUNITY
Start: 2023-12-12 | End: 1900-01-01

## 2023-12-15 ENCOUNTER — TRANSCRIPTION ENCOUNTER (OUTPATIENT)
Age: 73
End: 2023-12-15

## 2023-12-20 ENCOUNTER — RX RENEWAL (OUTPATIENT)
Age: 73
End: 2023-12-20

## 2023-12-28 ENCOUNTER — NON-APPOINTMENT (OUTPATIENT)
Age: 73
End: 2023-12-28

## 2024-01-02 ENCOUNTER — APPOINTMENT (OUTPATIENT)
Dept: ORTHOPEDIC SURGERY | Facility: CLINIC | Age: 74
End: 2024-01-02
Payer: MEDICARE

## 2024-01-02 VITALS — BODY MASS INDEX: 29.02 KG/M2 | WEIGHT: 170 LBS | HEIGHT: 64 IN

## 2024-01-02 PROCEDURE — 99024 POSTOP FOLLOW-UP VISIT: CPT

## 2024-01-02 PROCEDURE — 73502 X-RAY EXAM HIP UNI 2-3 VIEWS: CPT

## 2024-01-16 ENCOUNTER — RX RENEWAL (OUTPATIENT)
Age: 74
End: 2024-01-16

## 2024-01-30 RX ORDER — AMLODIPINE BESYLATE 2.5 MG/1
2.5 TABLET ORAL
Qty: 90 | Refills: 3 | Status: ACTIVE | COMMUNITY
Start: 2019-04-12 | End: 1900-01-01

## 2024-02-06 ENCOUNTER — TRANSCRIPTION ENCOUNTER (OUTPATIENT)
Age: 74
End: 2024-02-06

## 2024-02-29 NOTE — H&P PST ADULT - PHARYNX
[Mole Changes] : mole changes [Suicidal] : not suicidal [Anxiety] : anxiety [Negative] : Heme/Lymph [de-identified] : tremor, pareshtesias no redness

## 2024-03-22 ENCOUNTER — RX RENEWAL (OUTPATIENT)
Age: 74
End: 2024-03-22

## 2024-03-28 ENCOUNTER — RX RENEWAL (OUTPATIENT)
Age: 74
End: 2024-03-28

## 2024-03-28 RX ORDER — PENTOXIFYLLINE 400 MG/1
400 TABLET, EXTENDED RELEASE ORAL ONCE
Qty: 270 | Refills: 0 | Status: ACTIVE | COMMUNITY
Start: 2019-05-13 | End: 1900-01-01

## 2024-04-01 PROBLEM — Z96.642 STATUS POST LEFT HIP REPLACEMENT: Status: ACTIVE | Noted: 2023-10-24

## 2024-04-02 ENCOUNTER — APPOINTMENT (OUTPATIENT)
Dept: ORTHOPEDIC SURGERY | Facility: CLINIC | Age: 74
End: 2024-04-02

## 2024-04-02 ENCOUNTER — APPOINTMENT (OUTPATIENT)
Dept: ORTHOPEDIC SURGERY | Facility: CLINIC | Age: 74
End: 2024-04-02
Payer: MEDICARE

## 2024-04-02 VITALS — HEIGHT: 64 IN | BODY MASS INDEX: 28.51 KG/M2 | WEIGHT: 167 LBS

## 2024-04-02 DIAGNOSIS — Z96.642 PRESENCE OF LEFT ARTIFICIAL HIP JOINT: ICD-10-CM

## 2024-04-02 DIAGNOSIS — M76.892 OTHER SPECIFIED ENTHESOPATHIES OF LEFT LOWER LIMB, EXCLUDING FOOT: ICD-10-CM

## 2024-04-02 PROCEDURE — 99214 OFFICE O/P EST MOD 30 MIN: CPT

## 2024-04-02 PROCEDURE — 73502 X-RAY EXAM HIP UNI 2-3 VIEWS: CPT | Mod: 26

## 2024-04-19 ENCOUNTER — NON-APPOINTMENT (OUTPATIENT)
Age: 74
End: 2024-04-19

## 2024-04-19 ASSESSMENT — HOOS JR
HOOS JR RAW SCORE: 3
GOING UP OR DOWN STAIRS: MILD
LYING IN BED (TURNING OVER, MAINTAINING HIP POSITION): MILD
RISING FROM SITTING: MILD

## 2024-05-01 ENCOUNTER — RX RENEWAL (OUTPATIENT)
Age: 74
End: 2024-05-01

## 2024-05-01 RX ORDER — DICLOFENAC SODIUM 75 MG/1
75 TABLET, DELAYED RELEASE ORAL TWICE DAILY
Qty: 90 | Refills: 0 | Status: ACTIVE | COMMUNITY
Start: 2023-09-06 | End: 1900-01-01

## 2024-06-03 ENCOUNTER — NON-APPOINTMENT (OUTPATIENT)
Age: 74
End: 2024-06-03

## 2024-06-06 NOTE — OCCUPATIONAL THERAPY INITIAL EVALUATION ADULT - LEVEL OF INDEPENDENCE: TOILET, REHAB EVAL
Detail Level: Detailed
Quality 226: Preventive Care And Screening: Tobacco Use: Screening And Cessation Intervention: Patient screened for tobacco use and is an ex/non-smoker
contact guard

## 2024-06-18 ENCOUNTER — APPOINTMENT (OUTPATIENT)
Dept: ORTHOPEDIC SURGERY | Facility: CLINIC | Age: 74
End: 2024-06-18
Payer: MEDICARE

## 2024-06-18 VITALS — HEIGHT: 64 IN | WEIGHT: 170 LBS | BODY MASS INDEX: 29.02 KG/M2

## 2024-06-18 DIAGNOSIS — Z96.642 PRESENCE OF LEFT ARTIFICIAL HIP JOINT: ICD-10-CM

## 2024-06-18 DIAGNOSIS — M51.26 OTHER INTERVERTEBRAL DISC DISPLACEMENT, LUMBAR REGION: ICD-10-CM

## 2024-06-18 PROCEDURE — 99213 OFFICE O/P EST LOW 20 MIN: CPT

## 2024-06-18 PROCEDURE — 73502 X-RAY EXAM HIP UNI 2-3 VIEWS: CPT

## 2024-06-21 ENCOUNTER — RX RENEWAL (OUTPATIENT)
Age: 74
End: 2024-06-21

## 2024-06-21 RX ORDER — ATORVASTATIN CALCIUM 10 MG/1
10 TABLET, FILM COATED ORAL
Qty: 90 | Refills: 0 | Status: ACTIVE | COMMUNITY
Start: 2019-05-13 | End: 1900-01-01

## 2024-08-12 ENCOUNTER — APPOINTMENT (OUTPATIENT)
Dept: ORTHOPEDIC SURGERY | Facility: CLINIC | Age: 74
End: 2024-08-12
Payer: MEDICARE

## 2024-08-12 VITALS — WEIGHT: 170 LBS | BODY MASS INDEX: 29.02 KG/M2 | HEIGHT: 64 IN

## 2024-08-12 DIAGNOSIS — M51.36 OTHER INTERVERTEBRAL DISC DEGENERATION, LUMBAR REGION: ICD-10-CM

## 2024-08-12 PROCEDURE — 72100 X-RAY EXAM L-S SPINE 2/3 VWS: CPT

## 2024-08-12 PROCEDURE — 99214 OFFICE O/P EST MOD 30 MIN: CPT

## 2024-08-12 RX ORDER — DICLOFENAC SODIUM 75 MG/1
75 TABLET, DELAYED RELEASE ORAL
Qty: 60 | Refills: 1 | Status: ACTIVE | COMMUNITY
Start: 2024-08-12 | End: 1900-01-01

## 2024-08-12 NOTE — DISCUSSION/SUMMARY
[de-identified] : Mild lumbar disc degenerative disease. Leg length discrepancy, left longer than right. Discussed all options. She will see a podiatrist for her leg length discrepancy. Diclofenac PRN. Referral for physical therapy. All options discussed including rest, medicine, home exercise, acupuncture, Chiropractic care, Physical Therapy, Pain management, and last resort surgery. All questions were answered, all alternatives discussed, and the patient is in complete agreement with the treatment plan which the patient contributed to and discussed with me through the shared decision-making process. Follow-up appointment as instructed. Any issues and the patient will call or come in sooner.

## 2024-08-12 NOTE — HISTORY OF PRESENT ILLNESS
[Stable] : stable [de-identified] : 73 year old female presents for evaluation of chronic lower back pain.  Last seen in July 2023. She underwent a left hip replacement in Nov 2023 with Dr. Edward and notes that she has had a leg length discrepancy since the surgery.  She notes that her left leg feels longer than the right leg.  She has tried shoe lifts but it has not helped. Her back pain has increased due to this.  Pain is localized to the lower back. Denies numbness/tingling.  Has tried diclofenac which helped.  She was also diagnosed with left hip tendinitis after over-doing her hip exercises at the pool.  She has been participating with PT post-op for the hip.  Denies LINA.  PMHx: s/p left THR in Nov 2023 with Dr. Edward.  No fever chills sweats nausea vomiting no bowel or bladder dysfunction, no recent weight loss or gain no night pain. This history is in addition to the intake form that I personally reviewed.

## 2024-08-12 NOTE — PHYSICAL EXAM
[Normal] : Gait: normal [Patterson's Sign] : negative Patterson's sign [Pronator Drift] : negative pronator drift [SLR] : negative straight leg raise [de-identified] : 5 out of 5 motor strength, sensation is intact and symmetrical full range of motion flexion extension and rotation, no palpatory tenderness full range of motion of hips knees shoulders and elbows (all four extremities), no atrophy, negative straight leg raise, no pathological reflexes, no swelling, normal ambulation, no apparent distress skin is intact, no palpable lymph nodes, no upper or lower extremity instability, alert and oriented x3 and normal mood. Normal finger-to nose test.  [de-identified] : XR AP Lat Lumbar 08/12/2024 -Left BRICE, mild degeneration- reviewed with patient.    XR AP Lat Lumbar July 2023  -mild lumbar degeneration, severe hip arthritis- reviewed with the patient.

## 2024-08-12 NOTE — ADDENDUM
[FreeTextEntry1] : This note was written by Errol Montalvo on 08/12/2024 acting as scribe for Dr. Sudeep Adams M.D.  I, Sudeep Adams MD, have read and attest that all the information, medical decision making and discharge instructions within are true and accurate.

## 2024-09-26 ENCOUNTER — NON-APPOINTMENT (OUTPATIENT)
Age: 74
End: 2024-09-26

## 2024-10-21 ENCOUNTER — RX RENEWAL (OUTPATIENT)
Age: 74
End: 2024-10-21

## 2024-12-16 ENCOUNTER — RX RENEWAL (OUTPATIENT)
Age: 74
End: 2024-12-16

## 2024-12-20 ENCOUNTER — RX RENEWAL (OUTPATIENT)
Age: 74
End: 2024-12-20

## 2025-01-03 ENCOUNTER — APPOINTMENT (OUTPATIENT)
Dept: OBGYN | Facility: CLINIC | Age: 75
End: 2025-01-03

## 2025-03-23 ENCOUNTER — NON-APPOINTMENT (OUTPATIENT)
Age: 75
End: 2025-03-23

## 2025-03-24 ENCOUNTER — APPOINTMENT (OUTPATIENT)
Dept: OTOLARYNGOLOGY | Facility: CLINIC | Age: 75
End: 2025-03-24
Payer: MEDICARE

## 2025-03-24 VITALS — HEART RATE: 75 BPM | DIASTOLIC BLOOD PRESSURE: 64 MMHG | SYSTOLIC BLOOD PRESSURE: 136 MMHG | TEMPERATURE: 98 F

## 2025-03-24 DIAGNOSIS — J34.3 HYPERTROPHY OF NASAL TURBINATES: ICD-10-CM

## 2025-03-24 DIAGNOSIS — H90.3 SENSORINEURAL HEARING LOSS, BILATERAL: ICD-10-CM

## 2025-03-24 PROBLEM — H69.93 ETD (EUSTACHIAN TUBE DYSFUNCTION), BILATERAL: Status: ACTIVE | Noted: 2019-05-06

## 2025-03-24 PROCEDURE — 99203 OFFICE O/P NEW LOW 30 MIN: CPT

## 2025-03-24 RX ORDER — METHYLPREDNISOLONE 4 MG/1
4 TABLET ORAL
Qty: 1 | Refills: 0 | Status: ACTIVE | COMMUNITY
Start: 2025-03-24 | End: 1900-01-01

## 2025-03-24 RX ORDER — FLUTICASONE PROPIONATE 50 UG/1
50 SPRAY, METERED NASAL
Qty: 3 | Refills: 3 | Status: ACTIVE | COMMUNITY
Start: 2025-03-24 | End: 1900-01-01

## 2025-03-26 ENCOUNTER — APPOINTMENT (OUTPATIENT)
Dept: OBGYN | Facility: CLINIC | Age: 75
End: 2025-03-26

## 2025-03-26 PROCEDURE — G0101: CPT

## 2025-03-26 PROCEDURE — 99213 OFFICE O/P EST LOW 20 MIN: CPT | Mod: 25

## 2025-03-26 PROCEDURE — 81002 URINALYSIS NONAUTO W/O SCOPE: CPT

## 2025-03-31 ENCOUNTER — APPOINTMENT (OUTPATIENT)
Dept: OTOLARYNGOLOGY | Facility: CLINIC | Age: 75
End: 2025-03-31
Payer: MEDICARE

## 2025-03-31 DIAGNOSIS — H69.93 UNSPECIFIED EUSTACHIAN TUBE DISORDER, BILATERAL: ICD-10-CM

## 2025-03-31 DIAGNOSIS — H90.6 MIXED CONDUCTIVE AND SENSORINEURAL HEARING LOSS, BILATERAL: ICD-10-CM

## 2025-03-31 PROCEDURE — 99212 OFFICE O/P EST SF 10 MIN: CPT

## 2025-03-31 PROCEDURE — G2211 COMPLEX E/M VISIT ADD ON: CPT

## 2025-03-31 PROCEDURE — 92567 TYMPANOMETRY: CPT

## 2025-03-31 PROCEDURE — 92557 COMPREHENSIVE HEARING TEST: CPT

## 2025-04-26 ENCOUNTER — RX RENEWAL (OUTPATIENT)
Age: 75
End: 2025-04-26

## 2025-04-29 ENCOUNTER — NON-APPOINTMENT (OUTPATIENT)
Age: 75
End: 2025-04-29

## 2025-04-29 ENCOUNTER — LABORATORY RESULT (OUTPATIENT)
Age: 75
End: 2025-04-29

## 2025-04-29 ENCOUNTER — APPOINTMENT (OUTPATIENT)
Dept: INTERNAL MEDICINE | Facility: CLINIC | Age: 75
End: 2025-04-29
Payer: MEDICARE

## 2025-04-29 VITALS — WEIGHT: 174 LBS | BODY MASS INDEX: 31.22 KG/M2 | HEIGHT: 62.6 IN

## 2025-04-29 VITALS — BODY MASS INDEX: 31.22 KG/M2 | WEIGHT: 174 LBS | HEIGHT: 62.76 IN

## 2025-04-29 VITALS — DIASTOLIC BLOOD PRESSURE: 64 MMHG | SYSTOLIC BLOOD PRESSURE: 130 MMHG

## 2025-04-29 DIAGNOSIS — H69.93 UNSPECIFIED EUSTACHIAN TUBE DISORDER, BILATERAL: ICD-10-CM

## 2025-04-29 DIAGNOSIS — H61.23 IMPACTED CERUMEN, BILATERAL: ICD-10-CM

## 2025-04-29 DIAGNOSIS — E78.00 PURE HYPERCHOLESTEROLEMIA, UNSPECIFIED: ICD-10-CM

## 2025-04-29 DIAGNOSIS — R00.2 PALPITATIONS: ICD-10-CM

## 2025-04-29 DIAGNOSIS — Z87.898 PERSONAL HISTORY OF OTHER SPECIFIED CONDITIONS: ICD-10-CM

## 2025-04-29 DIAGNOSIS — M76.892 OTHER SPECIFIED ENTHESOPATHIES OF LEFT LOWER LIMB, EXCLUDING FOOT: ICD-10-CM

## 2025-04-29 DIAGNOSIS — H90.6 MIXED CONDUCTIVE AND SENSORINEURAL HEARING LOSS, BILATERAL: ICD-10-CM

## 2025-04-29 DIAGNOSIS — Z00.00 ENCOUNTER FOR GENERAL ADULT MEDICAL EXAMINATION W/OUT ABNORMAL FINDINGS: ICD-10-CM

## 2025-04-29 DIAGNOSIS — Z96.642 PRESENCE OF LEFT ARTIFICIAL HIP JOINT: ICD-10-CM

## 2025-04-29 DIAGNOSIS — I10 ESSENTIAL (PRIMARY) HYPERTENSION: ICD-10-CM

## 2025-04-29 DIAGNOSIS — Z87.09 PERSONAL HISTORY OF OTHER DISEASES OF THE RESPIRATORY SYSTEM: ICD-10-CM

## 2025-04-29 DIAGNOSIS — Z86.79 PERSONAL HISTORY OF OTHER DISEASES OF THE CIRCULATORY SYSTEM: ICD-10-CM

## 2025-04-29 PROCEDURE — 93000 ELECTROCARDIOGRAM COMPLETE: CPT

## 2025-04-29 PROCEDURE — G0439: CPT

## 2025-04-29 PROCEDURE — 36415 COLL VENOUS BLD VENIPUNCTURE: CPT

## 2025-04-30 DIAGNOSIS — E53.8 DEFICIENCY OF OTHER SPECIFIED B GROUP VITAMINS: ICD-10-CM

## 2025-04-30 LAB
ALBUMIN SERPL ELPH-MCNC: 4.4 G/DL
ALP BLD-CCNC: 73 U/L
ALT SERPL-CCNC: 22 U/L
ANION GAP SERPL CALC-SCNC: 15 MMOL/L
APPEARANCE: CLEAR
AST SERPL-CCNC: 30 U/L
BACTERIA: NEGATIVE /HPF
BASOPHILS # BLD AUTO: 0.06 K/UL
BASOPHILS NFR BLD AUTO: 0.9 %
BILIRUB SERPL-MCNC: 0.6 MG/DL
BILIRUBIN URINE: NEGATIVE
BLOOD URINE: NEGATIVE
BUN SERPL-MCNC: 11 MG/DL
CALCIUM SERPL-MCNC: 9.7 MG/DL
CAST: 0 /LPF
CHLORIDE SERPL-SCNC: 98 MMOL/L
CHOLEST SERPL-MCNC: 147 MG/DL
CO2 SERPL-SCNC: 22 MMOL/L
COLOR: YELLOW
CREAT SERPL-MCNC: 0.82 MG/DL
EGFRCR SERPLBLD CKD-EPI 2021: 76 ML/MIN/1.73M2
EOSINOPHIL # BLD AUTO: 0.11 K/UL
EOSINOPHIL NFR BLD AUTO: 1.6 %
EPITHELIAL CELLS: 0 /HPF
ESTIMATED AVERAGE GLUCOSE: 120 MG/DL
FERRITIN SERPL-MCNC: 64 NG/ML
FOLATE SERPL-MCNC: 8.9 NG/ML
GLUCOSE QUALITATIVE U: NEGATIVE MG/DL
GLUCOSE SERPL-MCNC: 89 MG/DL
HBA1C MFR BLD HPLC: 5.8 %
HCT VFR BLD CALC: 48.2 %
HDLC SERPL-MCNC: 61 MG/DL
HGB BLD-MCNC: 15.6 G/DL
IMM GRANULOCYTES NFR BLD AUTO: 0.3 %
KETONES URINE: NEGATIVE MG/DL
LDLC SERPL-MCNC: 70 MG/DL
LEUKOCYTE ESTERASE URINE: ABNORMAL
LYMPHOCYTES # BLD AUTO: 1.86 K/UL
LYMPHOCYTES NFR BLD AUTO: 26.7 %
MAN DIFF?: NORMAL
MCHC RBC-ENTMCNC: 29.4 PG
MCHC RBC-ENTMCNC: 32.4 G/DL
MCV RBC AUTO: 90.9 FL
MICROSCOPIC-UA: NORMAL
MONOCYTES # BLD AUTO: 0.56 K/UL
MONOCYTES NFR BLD AUTO: 8 %
NEUTROPHILS # BLD AUTO: 4.35 K/UL
NEUTROPHILS NFR BLD AUTO: 62.5 %
NITRITE URINE: NEGATIVE
NONHDLC SERPL-MCNC: 87 MG/DL
PH URINE: 6.5
PLATELET # BLD AUTO: 239 K/UL
POTASSIUM SERPL-SCNC: 4.4 MMOL/L
PROT SERPL-MCNC: 6.9 G/DL
PROTEIN URINE: NEGATIVE MG/DL
RBC # BLD: 5.3 M/UL
RBC # FLD: 14.2 %
RED BLOOD CELLS URINE: 0 /HPF
SODIUM SERPL-SCNC: 135 MMOL/L
SPECIFIC GRAVITY URINE: 1
T3RU NFR SERPL: 1 TBI
T4 SERPL-MCNC: 8.9 UG/DL
TRIGL SERPL-MCNC: 89 MG/DL
TSH SERPL-ACNC: 3.12 UIU/ML
URATE SERPL-MCNC: 3.4 MG/DL
UROBILINOGEN URINE: 0.2 MG/DL
VIT B12 SERPL-MCNC: 306 PG/ML
WBC # FLD AUTO: 6.96 K/UL
WHITE BLOOD CELLS URINE: 3 /HPF

## 2025-04-30 RX ORDER — CYANOCOBALAMIN (VITAMIN B-12) 1000 MCG
1000 TABLET ORAL DAILY
Refills: 0 | Status: ACTIVE | COMMUNITY
Start: 2025-04-30

## 2025-09-16 ENCOUNTER — RX RENEWAL (OUTPATIENT)
Age: 75
End: 2025-09-16

## 2025-09-16 ENCOUNTER — NON-APPOINTMENT (OUTPATIENT)
Age: 75
End: 2025-09-16

## (undated) DEVICE — TAPE SILK 3"

## (undated) DEVICE — ELCTR BOVIE TIP BLADE VALLEYLAB 6.5"

## (undated) DEVICE — SUT TICRON 0 30" TIES

## (undated) DEVICE — GLV 8.5 PROTEXIS (WHITE)

## (undated) DEVICE — SUT ETHIBOND EXCEL 2 30" OS-4

## (undated) DEVICE — Device

## (undated) DEVICE — DRSG AQUACEL 3.5 X 10"

## (undated) DEVICE — SPECIMEN CONTAINER 100ML

## (undated) DEVICE — SPECIMEN TRAP 70ML

## (undated) DEVICE — PACK TOTAL HIP (2 PACKS)

## (undated) DEVICE — SOL IRR POUR H2O 1000ML

## (undated) DEVICE — SUT POLYSORB 0 36" GS-25 UNDYED

## (undated) DEVICE — NDL HYPO SAFE 22G X 1.5" (BLACK)

## (undated) DEVICE — VENODYNE/SCD SLEEVE CALF LARGE

## (undated) DEVICE — SUT MONOCRYL 3-0 18" PS-2 UNDYED

## (undated) DEVICE — POSITIONER FOAM EGG CRATE ULNAR 2PCS (PINK)

## (undated) DEVICE — NDL HYPO SAFE 18G X 1.5" (PINK)

## (undated) DEVICE — STRYKER INTERPULSE HANDPIECE W IRR SUCTION TUBE

## (undated) DEVICE — GLV 7.5 PROTEXIS (WHITE)

## (undated) DEVICE — WARMING BLANKET UPPER ADULT

## (undated) DEVICE — GLV 8 PROTEXIS (WHITE)

## (undated) DEVICE — SAW BLADE STRYKER RECIPROCATING 77.6X0.77X11.2MM

## (undated) DEVICE — HOOD FLYTE STRYKER HELMET SHIELD

## (undated) DEVICE — TUBING SUCTION NON CONDUCTIVE 316X18" STERILE

## (undated) DEVICE — POSITIONER FOAM ABDUCTION PILLOW MED (PINK)

## (undated) DEVICE — ELCTR BOVIE PENCIL SMOKE EVACUATION

## (undated) DEVICE — TUBING TUR 2 PRONG

## (undated) DEVICE — SOL INJ NS 0.9% 500ML 1-PORT

## (undated) DEVICE — SOL IRR POUR NS 0.9% 500ML

## (undated) DEVICE — SOL INJ NS 0.9% 1000ML

## (undated) DEVICE — SUT POLYSORB 2-0 30" GS-21 UNDYED

## (undated) DEVICE — DRSG DERMABOND 0.7ML

## (undated) DEVICE — SYR LUER LOK 20CC

## (undated) DEVICE — SUT POLYSORB 1 36" GS-25